# Patient Record
Sex: FEMALE | Race: BLACK OR AFRICAN AMERICAN | Employment: OTHER | URBAN - METROPOLITAN AREA
[De-identification: names, ages, dates, MRNs, and addresses within clinical notes are randomized per-mention and may not be internally consistent; named-entity substitution may affect disease eponyms.]

---

## 2021-07-21 ENCOUNTER — LAB ENCOUNTER (OUTPATIENT)
Dept: LAB | Facility: HOSPITAL | Age: 66
End: 2021-07-21
Attending: INTERNAL MEDICINE
Payer: COMMERCIAL

## 2021-07-21 ENCOUNTER — OFFICE VISIT (OUTPATIENT)
Dept: RHEUMATOLOGY | Facility: CLINIC | Age: 66
End: 2021-07-21
Payer: MEDICARE

## 2021-07-21 ENCOUNTER — HOSPITAL ENCOUNTER (OUTPATIENT)
Dept: GENERAL RADIOLOGY | Facility: HOSPITAL | Age: 66
Discharge: HOME | End: 2021-07-21
Attending: INTERNAL MEDICINE
Payer: COMMERCIAL

## 2021-07-21 VITALS
DIASTOLIC BLOOD PRESSURE: 74 MMHG | BODY MASS INDEX: 27.82 KG/M2 | HEIGHT: 65 IN | HEART RATE: 69 BPM | WEIGHT: 167 LBS | SYSTOLIC BLOOD PRESSURE: 108 MMHG

## 2021-07-21 DIAGNOSIS — M79.641 RIGHT HAND PAIN: ICD-10-CM

## 2021-07-21 DIAGNOSIS — M25.50 POLYARTHRALGIA: ICD-10-CM

## 2021-07-21 DIAGNOSIS — M79.641 RIGHT HAND PAIN: Primary | ICD-10-CM

## 2021-07-21 LAB
CRP SERPL-MCNC: 0.62 MG/DL (ref ?–0.3)
ERYTHROCYTE [SEDIMENTATION RATE] IN BLOOD: 34 MM/HR
RHEUMATOID FACT SERPL-ACNC: <10 IU/ML (ref ?–15)

## 2021-07-21 PROCEDURE — 73130 X-RAY EXAM OF HAND: CPT | Performed by: INTERNAL MEDICINE

## 2021-07-21 PROCEDURE — 86140 C-REACTIVE PROTEIN: CPT | Performed by: INTERNAL MEDICINE

## 2021-07-21 PROCEDURE — 86200 CCP ANTIBODY: CPT | Performed by: INTERNAL MEDICINE

## 2021-07-21 PROCEDURE — 36415 COLL VENOUS BLD VENIPUNCTURE: CPT | Performed by: INTERNAL MEDICINE

## 2021-07-21 PROCEDURE — 99204 OFFICE O/P NEW MOD 45 MIN: CPT | Performed by: INTERNAL MEDICINE

## 2021-07-21 PROCEDURE — 86431 RHEUMATOID FACTOR QUANT: CPT | Performed by: INTERNAL MEDICINE

## 2021-07-21 PROCEDURE — 85652 RBC SED RATE AUTOMATED: CPT | Performed by: INTERNAL MEDICINE

## 2021-07-21 RX ORDER — HYDROCHLOROTHIAZIDE 12.5 MG/1
12.5 CAPSULE, GELATIN COATED ORAL DAILY
COMMUNITY

## 2021-07-21 NOTE — PROGRESS NOTES
Ranjith Agosto is a 73year old female who presents for Patient presents with:  Consult  . HPI:   CC: joint pain  Consult: referred by PCP Dr. Abiodun Espinoza    This is a 74 yo F with hx of HTN, HLD, DM-2 days with joint pain.   She reports joint pain for many ye photosensitivity, no psoriasis  HEENT: No dry eyes, no dry mouth, no Raynaud's, no nasal ulcers, no parotid swelling, no neck pain, no jaw pain, no temple pain  Eyes: No visual changes,   CVS: No chest pain, no heart disease  RS: No SOB, no Cough, No Pleur worsened in March where she was not able to make a fist with her right hand. She also has limited abduction and flexion in both shoulders.   She has some swelling her MCPs, will work-up for inflammatory arthritis  - Plan to order ESR, CRP, RF and CCP  - Pl

## 2021-07-21 NOTE — PATIENT INSTRUCTIONS
You were seen today for joint pain, mostly in your hands and your shoulders  Plan to evaluate for autoimmune disease such as rheumatoid arthritis  Blood work and x-rays today  Follow-up in 2 weeks

## 2021-07-23 LAB — CCP IGG SERPL-ACNC: 0.7 U/ML (ref 0–6.9)

## 2021-07-29 ENCOUNTER — HOSPITAL ENCOUNTER (OUTPATIENT)
Dept: ULTRASOUND IMAGING | Facility: HOSPITAL | Age: 66
Discharge: HOME | End: 2021-07-29
Attending: INTERNAL MEDICINE
Payer: COMMERCIAL

## 2021-07-29 DIAGNOSIS — M79.641 RIGHT HAND PAIN: ICD-10-CM

## 2021-07-29 PROCEDURE — 76881 US COMPL JOINT R-T W/IMG: CPT | Performed by: INTERNAL MEDICINE

## 2021-08-04 ENCOUNTER — LAB ENCOUNTER (OUTPATIENT)
Dept: LAB | Facility: HOSPITAL | Age: 66
End: 2021-08-04
Attending: INTERNAL MEDICINE
Payer: COMMERCIAL

## 2021-08-04 ENCOUNTER — OFFICE VISIT (OUTPATIENT)
Dept: RHEUMATOLOGY | Facility: CLINIC | Age: 66
End: 2021-08-04
Payer: MEDICARE

## 2021-08-04 VITALS
HEART RATE: 76 BPM | SYSTOLIC BLOOD PRESSURE: 101 MMHG | DIASTOLIC BLOOD PRESSURE: 67 MMHG | WEIGHT: 166 LBS | HEIGHT: 65 IN | BODY MASS INDEX: 27.66 KG/M2

## 2021-08-04 DIAGNOSIS — R76.8 HEPATITIS B CORE ANTIBODY POSITIVE: ICD-10-CM

## 2021-08-04 DIAGNOSIS — M06.00 SERONEGATIVE RHEUMATOID ARTHRITIS (HCC): ICD-10-CM

## 2021-08-04 DIAGNOSIS — M06.00 SERONEGATIVE RHEUMATOID ARTHRITIS (HCC): Primary | ICD-10-CM

## 2021-08-04 LAB
ALBUMIN SERPL-MCNC: 3.5 G/DL (ref 3.4–5)
ALBUMIN/GLOB SERPL: 0.9 {RATIO} (ref 1–2)
ALP LIVER SERPL-CCNC: 57 U/L
ALT SERPL-CCNC: 14 U/L
ANION GAP SERPL CALC-SCNC: 7 MMOL/L (ref 0–18)
AST SERPL-CCNC: 8 U/L (ref 15–37)
BASOPHILS # BLD AUTO: 0.03 X10(3) UL (ref 0–0.2)
BASOPHILS NFR BLD AUTO: 0.5 %
BILIRUB SERPL-MCNC: 0.4 MG/DL (ref 0.1–2)
BUN BLD-MCNC: 10 MG/DL (ref 7–18)
BUN/CREAT SERPL: 18.2 (ref 10–20)
CALCIUM BLD-MCNC: 9.4 MG/DL (ref 8.5–10.1)
CHLORIDE SERPL-SCNC: 103 MMOL/L (ref 98–112)
CO2 SERPL-SCNC: 27 MMOL/L (ref 21–32)
CREAT BLD-MCNC: 0.55 MG/DL
DEPRECATED RDW RBC AUTO: 41.7 FL (ref 35.1–46.3)
EOSINOPHIL # BLD AUTO: 0.01 X10(3) UL (ref 0–0.7)
EOSINOPHIL NFR BLD AUTO: 0.2 %
ERYTHROCYTE [DISTWIDTH] IN BLOOD BY AUTOMATED COUNT: 12.9 % (ref 11–15)
GLOBULIN PLAS-MCNC: 4.1 G/DL (ref 2.8–4.4)
GLUCOSE BLD-MCNC: 78 MG/DL (ref 70–99)
HBV CORE AB SERPL QL IA: REACTIVE
HBV SURFACE AB SER QL: REACTIVE
HBV SURFACE AB SERPL IA-ACNC: 411.61 MIU/ML
HBV SURFACE AG SER-ACNC: <0.1 [IU]/L
HBV SURFACE AG SERPL QL IA: NONREACTIVE
HCT VFR BLD AUTO: 37.9 %
HCV AB SERPL QL IA: NONREACTIVE
HGB BLD-MCNC: 12.3 G/DL
IMM GRANULOCYTES # BLD AUTO: 0.05 X10(3) UL (ref 0–1)
IMM GRANULOCYTES NFR BLD: 0.8 %
LYMPHOCYTES # BLD AUTO: 1.25 X10(3) UL (ref 1–4)
LYMPHOCYTES NFR BLD AUTO: 21 %
M PROTEIN MFR SERPL ELPH: 7.6 G/DL (ref 6.4–8.2)
MCH RBC QN AUTO: 28.7 PG (ref 26–34)
MCHC RBC AUTO-ENTMCNC: 32.5 G/DL (ref 31–37)
MCV RBC AUTO: 88.6 FL
MONOCYTES # BLD AUTO: 0.47 X10(3) UL (ref 0.1–1)
MONOCYTES NFR BLD AUTO: 7.9 %
NEUTROPHILS # BLD AUTO: 4.13 X10 (3) UL (ref 1.5–7.7)
NEUTROPHILS # BLD AUTO: 4.13 X10(3) UL (ref 1.5–7.7)
NEUTROPHILS NFR BLD AUTO: 69.6 %
OSMOLALITY SERPL CALC.SUM OF ELEC: 282 MOSM/KG (ref 275–295)
PATIENT FASTING Y/N/NP: NO
PLATELET # BLD AUTO: 219 10(3)UL (ref 150–450)
POTASSIUM SERPL-SCNC: 3.3 MMOL/L (ref 3.5–5.1)
RBC # BLD AUTO: 4.28 X10(6)UL
SODIUM SERPL-SCNC: 137 MMOL/L (ref 136–145)
WBC # BLD AUTO: 5.9 X10(3) UL (ref 4–11)

## 2021-08-04 PROCEDURE — 82955 ASSAY OF G6PD ENZYME: CPT | Performed by: INTERNAL MEDICINE

## 2021-08-04 PROCEDURE — 86480 TB TEST CELL IMMUN MEASURE: CPT

## 2021-08-04 PROCEDURE — 87340 HEPATITIS B SURFACE AG IA: CPT | Performed by: INTERNAL MEDICINE

## 2021-08-04 PROCEDURE — 99214 OFFICE O/P EST MOD 30 MIN: CPT | Performed by: INTERNAL MEDICINE

## 2021-08-04 PROCEDURE — 36415 COLL VENOUS BLD VENIPUNCTURE: CPT | Performed by: INTERNAL MEDICINE

## 2021-08-04 PROCEDURE — 85025 COMPLETE CBC W/AUTO DIFF WBC: CPT | Performed by: INTERNAL MEDICINE

## 2021-08-04 PROCEDURE — 86803 HEPATITIS C AB TEST: CPT | Performed by: INTERNAL MEDICINE

## 2021-08-04 PROCEDURE — 80053 COMPREHEN METABOLIC PANEL: CPT | Performed by: INTERNAL MEDICINE

## 2021-08-04 PROCEDURE — 86704 HEP B CORE ANTIBODY TOTAL: CPT | Performed by: INTERNAL MEDICINE

## 2021-08-04 PROCEDURE — 86706 HEP B SURFACE ANTIBODY: CPT | Performed by: INTERNAL MEDICINE

## 2021-08-04 RX ORDER — PREDNISONE 10 MG/1
TABLET ORAL
Qty: 90 TABLET | Refills: 0 | Status: SHIPPED | OUTPATIENT
Start: 2021-08-04

## 2021-08-04 NOTE — PROGRESS NOTES
Darwin Huggins is a 73year old female. HPI:   Patient presents with: Follow - Up  Test Results      I had the pleasure of seeing Darwin Huggins on 8/4/2021 for follow up of newly dx PMR vs Seronegative RA.      Current Medications:  Ibuprofen 600 mg mayb prior to today's visit):  Current Outpatient Medications   Medication Sig Dispense Refill   • Coenzyme Q10 (COQ-10 OR) Take by mouth. • hydrochlorothiazide 12.5 MG Oral Cap Take 12.5 mg by mouth daily.      • Atorvastatin Calcium 20 MG Oral Tab TK 1 T P      ASSESSMENT/PLAN:     Seronegative RA (bilateral shoulder, hip stiffness and R hand swelling, ultrasound + for inflammatory arthritis)  - Plan to start prednisone 40 mg daily x4 days then 30 mg daily for x4 days then 20 mg daily x4 days then 10 mg sis

## 2021-08-04 NOTE — PATIENT INSTRUCTIONS
You were seen today for rheumatoid arthritis  Plan on getting some more blood work  We will start her on prednisone for now, Start 40 mg daily for 4 days then 30 mg daily for 4 days then 20 mg daily for 4 days then 10 mg daily for 4 days then stay on 5 mg

## 2021-08-06 LAB
M TB IFN-G CD4+ T-CELLS BLD-ACNC: 0.05 IU/ML
M TB TUBERC IFN-G BLD QL: NEGATIVE
M TB TUBERC IGNF/MITOGEN IGNF CONTROL: >10 IU/ML
QFT TB1 AG MINUS NIL: 0.02 IU/ML
QFT TB2 AG MINUS NIL: 0.02 IU/ML

## 2021-08-08 LAB — GLUCOSE-6-PHOSPHATE DEHYDROGENASE: 11.1 U/G HB

## 2021-08-10 ENCOUNTER — OFFICE VISIT (OUTPATIENT)
Dept: OTOLARYNGOLOGY | Facility: CLINIC | Age: 66
End: 2021-08-10
Payer: COMMERCIAL

## 2021-08-10 ENCOUNTER — LAB ENCOUNTER (OUTPATIENT)
Dept: LAB | Facility: HOSPITAL | Age: 66
End: 2021-08-10
Attending: INTERNAL MEDICINE
Payer: COMMERCIAL

## 2021-08-10 VITALS — HEIGHT: 65 IN | WEIGHT: 168 LBS | BODY MASS INDEX: 27.99 KG/M2

## 2021-08-10 DIAGNOSIS — M06.00 SERONEGATIVE RHEUMATOID ARTHRITIS (HCC): ICD-10-CM

## 2021-08-10 DIAGNOSIS — R76.8 HEPATITIS B CORE ANTIBODY POSITIVE: ICD-10-CM

## 2021-08-10 DIAGNOSIS — H61.23 BILATERAL IMPACTED CERUMEN: Primary | ICD-10-CM

## 2021-08-10 DIAGNOSIS — R42 DIZZINESS: ICD-10-CM

## 2021-08-10 PROCEDURE — 99203 OFFICE O/P NEW LOW 30 MIN: CPT | Performed by: OTOLARYNGOLOGY

## 2021-08-10 PROCEDURE — 36415 COLL VENOUS BLD VENIPUNCTURE: CPT

## 2021-08-10 PROCEDURE — 69210 REMOVE IMPACTED EAR WAX UNI: CPT | Performed by: OTOLARYNGOLOGY

## 2021-08-10 PROCEDURE — 3008F BODY MASS INDEX DOCD: CPT | Performed by: OTOLARYNGOLOGY

## 2021-08-10 PROCEDURE — 87517 HEPATITIS B DNA QUANT: CPT

## 2021-08-10 RX ORDER — MELATONIN
1000 DAILY
COMMUNITY
Start: 2021-03-15

## 2021-08-10 NOTE — PROGRESS NOTES
Rowdy Jay is a 73year old female. Patient presents with:  Dizziness: pt c/o dizziness, it has been going on for a few months now. She does not take any medication for this right now. She states it is a constant dizziness.   Ear Problem: pt states her wheezing. Cardio Negative Chest pain, irregular heartbeat/palpitations and syncope. GI Negative Abdominal pain and diarrhea. Endocrine Negative Cold intolerance and heat intolerance. Neuro Negative Tremors. Psych Negative Anxiety and depression. MCG Oral Tab, Take 1,000 mcg by mouth daily. , Disp: , Rfl:   •  predniSONE 10 MG Oral Tab, Start 40 mg daily for 4 days then 30 mg daily for 4 days then 20 mg daily for 4 days then 10 mg daily for 4 days then stay on 5 mg daily until you see me, Disp: 90 t

## 2021-08-13 LAB
HBV QNT BY NAAT (IU/ML): NOT DETECTED IU/ML
HBV QNT BY NAAT (LOG IU/ML): NOT DETECTED LOG IU/ML
HBV QNT BY NAAT INTERP: NOT DETECTED

## 2021-08-16 ENCOUNTER — TELEPHONE (OUTPATIENT)
Dept: OTOLARYNGOLOGY | Facility: CLINIC | Age: 66
End: 2021-08-16

## 2021-08-16 ENCOUNTER — TELEPHONE (OUTPATIENT)
Dept: RHEUMATOLOGY | Facility: CLINIC | Age: 66
End: 2021-08-16

## 2021-08-16 RX ORDER — METHOTREXATE 2.5 MG/1
TABLET ORAL
Qty: 72 TABLET | Refills: 0 | Status: SHIPPED | OUTPATIENT
Start: 2021-08-16 | End: 2021-10-20

## 2021-08-16 RX ORDER — FOLIC ACID 1 MG/1
1 TABLET ORAL DAILY
Qty: 90 TABLET | Refills: 0 | Status: SHIPPED | OUTPATIENT
Start: 2021-08-16 | End: 2021-10-20

## 2021-08-16 NOTE — TELEPHONE ENCOUNTER
Per pt has questions about the physical therapy. Per pt does not know what the physical therapy is for and if it is should be done prior to her vng appt.   Please advise

## 2021-08-18 ENCOUNTER — TELEPHONE (OUTPATIENT)
Dept: RHEUMATOLOGY | Facility: CLINIC | Age: 66
End: 2021-08-18

## 2021-08-18 NOTE — TELEPHONE ENCOUNTER
Spoke to patient and confined schedule and dosage listed below. She verbalized understanding.      08/16/ Lab not  \"Discussed results with patient. Elsa Scott is able to start methotrexate day. Yared Christensen start 4 pills weekly for 2 weeks and then increase to 6 pills

## 2021-08-18 NOTE — TELEPHONE ENCOUNTER
Rn advised pt the therapy is for patient dizziness,and Can schedule it before the VNG,pt verbalized understanding stated already made appt for physical therapy and VNG.

## 2021-08-18 NOTE — TELEPHONE ENCOUNTER
Patient was prescribed methotrexate and folic acid. Patient would like to know if she should start both medications at the same time. Please advise.

## 2021-09-01 ENCOUNTER — OFFICE VISIT (OUTPATIENT)
Dept: AUDIOLOGY | Facility: CLINIC | Age: 66
End: 2021-09-01
Payer: COMMERCIAL

## 2021-09-01 DIAGNOSIS — R42 DIZZINESS: Primary | ICD-10-CM

## 2021-09-01 PROCEDURE — 92537 CALORIC VSTBLR TEST W/REC: CPT | Performed by: AUDIOLOGIST

## 2021-09-01 PROCEDURE — 92567 TYMPANOMETRY: CPT | Performed by: AUDIOLOGIST

## 2021-09-01 PROCEDURE — 92517 VEMP TEST I&R CERVICAL: CPT | Performed by: AUDIOLOGIST

## 2021-09-01 PROCEDURE — 92557 COMPREHENSIVE HEARING TEST: CPT | Performed by: AUDIOLOGIST

## 2021-09-01 PROCEDURE — 92540 BASIC VESTIBULAR EVALUATION: CPT | Performed by: AUDIOLOGIST

## 2021-09-02 ENCOUNTER — TELEPHONE (OUTPATIENT)
Dept: PHYSICAL THERAPY | Facility: HOSPITAL | Age: 66
End: 2021-09-02

## 2021-09-04 NOTE — PROGRESS NOTES
AUDIOLOGY REPORT      Eloisa Fraser is a 73year old female     Referring Provider: Tanika Wills   YOB: 1955  Medical Record: [id number]      Patient Hearing History:  Patient reported a history of intermittent imbalance that first began abo Testing:  Sitting:  No horizontal nystagmus  Supine:  No horizontal nystagmus  Head Right: No horizontal nystagmus  Head Left: Mild left-beating horizontal nystagmus of 4-5 degrees per second. Right Lateral: No horizontal nystagmus.    Left Lateral: Mild

## 2021-09-07 ENCOUNTER — OFFICE VISIT (OUTPATIENT)
Dept: PHYSICAL THERAPY | Facility: HOSPITAL | Age: 66
End: 2021-09-07
Attending: OTOLARYNGOLOGY
Payer: COMMERCIAL

## 2021-09-07 DIAGNOSIS — R42 DIZZINESS: ICD-10-CM

## 2021-09-07 PROCEDURE — 97162 PT EVAL MOD COMPLEX 30 MIN: CPT

## 2021-09-08 ENCOUNTER — TELEPHONE (OUTPATIENT)
Dept: OTOLARYNGOLOGY | Facility: CLINIC | Age: 66
End: 2021-09-08

## 2021-09-08 ENCOUNTER — OFFICE VISIT (OUTPATIENT)
Dept: RHEUMATOLOGY | Facility: CLINIC | Age: 66
End: 2021-09-08
Payer: COMMERCIAL

## 2021-09-08 VITALS
SYSTOLIC BLOOD PRESSURE: 111 MMHG | HEIGHT: 65 IN | WEIGHT: 168 LBS | DIASTOLIC BLOOD PRESSURE: 74 MMHG | HEART RATE: 65 BPM | BODY MASS INDEX: 27.99 KG/M2

## 2021-09-08 DIAGNOSIS — M06.00 SERONEGATIVE RHEUMATOID ARTHRITIS (HCC): Primary | ICD-10-CM

## 2021-09-08 DIAGNOSIS — Z51.81 MEDICATION MONITORING ENCOUNTER: ICD-10-CM

## 2021-09-08 PROCEDURE — 99214 OFFICE O/P EST MOD 30 MIN: CPT | Performed by: INTERNAL MEDICINE

## 2021-09-08 RX ORDER — PREDNISONE 2.5 MG
2.5 TABLET ORAL DAILY
Qty: 30 TABLET | Refills: 0 | Status: SHIPPED | OUTPATIENT
Start: 2021-09-08

## 2021-09-08 NOTE — TELEPHONE ENCOUNTER
Sterling Valerio MD  P  Ent Clinical Staff  Inform pt that the vng test  was normal. This  indcates that the  dizziness is not coming from the  inner ears.  If the symptoms are not improving I  recommend she consider seeing neurology.

## 2021-09-08 NOTE — PROGRESS NOTES
Emelia Valencia is a 73year old female. HPI:   No chief complaint on file. I had the pleasure of seeing Emelia Valencia on 9/8/2021 for follow up of newly dx Seronegative RA.      Current Medications:  Methotrexate 6 pills weekly and FA daily- started Diagnosis Date   • Diabetes [facility])    • Essential hypertension    • Hyperlipidemia       Social Hx Reviewed   Family Hx Reviewed     Medications (Active prior to today's visit):  Current Outpatient Medications   Medication Sig Dispense Refill   • Methotre pain or warmth on palpation with FROM  Spine: no lumbar or sacral pain on palpation. NEURO: Cranial nerves II-XII intact grossly. 5/5 strength throughout in both upper and lower extremities, sensation intact.   PSYCH: normal mood       LABS:     Component

## 2021-09-08 NOTE — PATIENT INSTRUCTIONS
You were seen today for RA  Cont methotrexate 6 pills weekly  Folic acid daily  Go down to prednisone 2.5 mg daily for 2 weeks then 2.5 mg every other day for 2 weeks then stop  Blood work in 6 weeks  Follow up in 6 weeks

## 2021-09-17 ENCOUNTER — APPOINTMENT (OUTPATIENT)
Dept: PHYSICAL THERAPY | Facility: HOSPITAL | Age: 66
End: 2021-09-17
Attending: OTOLARYNGOLOGY
Payer: COMMERCIAL

## 2021-09-21 ENCOUNTER — TELEPHONE (OUTPATIENT)
Dept: PHYSICAL THERAPY | Facility: HOSPITAL | Age: 66
End: 2021-09-21

## 2021-09-23 ENCOUNTER — APPOINTMENT (OUTPATIENT)
Dept: PHYSICAL THERAPY | Facility: HOSPITAL | Age: 66
End: 2021-09-23
Attending: OTOLARYNGOLOGY
Payer: COMMERCIAL

## 2021-09-29 ENCOUNTER — APPOINTMENT (OUTPATIENT)
Dept: PHYSICAL THERAPY | Facility: HOSPITAL | Age: 66
End: 2021-09-29
Attending: OTOLARYNGOLOGY
Payer: COMMERCIAL

## 2021-10-07 ENCOUNTER — APPOINTMENT (OUTPATIENT)
Dept: PHYSICAL THERAPY | Facility: HOSPITAL | Age: 66
End: 2021-10-07
Attending: OTOLARYNGOLOGY
Payer: COMMERCIAL

## 2021-10-15 ENCOUNTER — APPOINTMENT (OUTPATIENT)
Dept: PHYSICAL THERAPY | Facility: HOSPITAL | Age: 66
End: 2021-10-15
Attending: OTOLARYNGOLOGY
Payer: COMMERCIAL

## 2021-10-17 ENCOUNTER — LAB ENCOUNTER (OUTPATIENT)
Dept: LAB | Facility: HOSPITAL | Age: 66
End: 2021-10-17
Attending: INTERNAL MEDICINE
Payer: MEDICARE

## 2021-10-17 DIAGNOSIS — M06.00 SERONEGATIVE RHEUMATOID ARTHRITIS (HCC): ICD-10-CM

## 2021-10-17 DIAGNOSIS — Z51.81 MEDICATION MONITORING ENCOUNTER: ICD-10-CM

## 2021-10-17 PROCEDURE — 85652 RBC SED RATE AUTOMATED: CPT

## 2021-10-17 PROCEDURE — 82040 ASSAY OF SERUM ALBUMIN: CPT

## 2021-10-17 PROCEDURE — 36415 COLL VENOUS BLD VENIPUNCTURE: CPT

## 2021-10-17 PROCEDURE — 82565 ASSAY OF CREATININE: CPT

## 2021-10-17 PROCEDURE — 84450 TRANSFERASE (AST) (SGOT): CPT

## 2021-10-17 PROCEDURE — 85025 COMPLETE CBC W/AUTO DIFF WBC: CPT

## 2021-10-17 PROCEDURE — 84460 ALANINE AMINO (ALT) (SGPT): CPT

## 2021-10-17 PROCEDURE — 86140 C-REACTIVE PROTEIN: CPT

## 2021-10-18 ENCOUNTER — TELEPHONE (OUTPATIENT)
Dept: RHEUMATOLOGY | Facility: CLINIC | Age: 66
End: 2021-10-18

## 2021-10-19 ENCOUNTER — TELEPHONE (OUTPATIENT)
Dept: PHYSICAL MEDICINE AND REHAB | Facility: CLINIC | Age: 66
End: 2021-10-19

## 2021-10-19 ENCOUNTER — LAB ENCOUNTER (OUTPATIENT)
Dept: LAB | Facility: HOSPITAL | Age: 66
End: 2021-10-19
Attending: Other
Payer: MEDICARE

## 2021-10-19 ENCOUNTER — OFFICE VISIT (OUTPATIENT)
Dept: NEUROLOGY | Facility: CLINIC | Age: 66
End: 2021-10-19
Payer: COMMERCIAL

## 2021-10-19 VITALS
SYSTOLIC BLOOD PRESSURE: 114 MMHG | BODY MASS INDEX: 29.02 KG/M2 | HEART RATE: 83 BPM | WEIGHT: 170 LBS | DIASTOLIC BLOOD PRESSURE: 66 MMHG | HEIGHT: 64 IN

## 2021-10-19 DIAGNOSIS — R42 DIZZY: Primary | ICD-10-CM

## 2021-10-19 DIAGNOSIS — R42 DIZZY: ICD-10-CM

## 2021-10-19 PROCEDURE — 86255 FLUORESCENT ANTIBODY SCREEN: CPT

## 2021-10-19 PROCEDURE — 3008F BODY MASS INDEX DOCD: CPT | Performed by: OTHER

## 2021-10-19 PROCEDURE — 82607 VITAMIN B-12: CPT

## 2021-10-19 PROCEDURE — 99204 OFFICE O/P NEW MOD 45 MIN: CPT | Performed by: OTHER

## 2021-10-19 PROCEDURE — 3078F DIAST BP <80 MM HG: CPT | Performed by: OTHER

## 2021-10-19 PROCEDURE — 84443 ASSAY THYROID STIM HORMONE: CPT

## 2021-10-19 PROCEDURE — 3074F SYST BP LT 130 MM HG: CPT | Performed by: OTHER

## 2021-10-19 PROCEDURE — 36415 COLL VENOUS BLD VENIPUNCTURE: CPT

## 2021-10-19 RX ORDER — IBUPROFEN 600 MG/1
600 TABLET ORAL AS DIRECTED
COMMUNITY

## 2021-10-19 NOTE — TELEPHONE ENCOUNTER
Initiated authorization for Brain MRI CPT 89366 to be done at [address] [place] [address] via [employer] online  Status: Approved with order #[id number] valid 10/19/21-12/17/21    LM informing patient of the above

## 2021-10-19 NOTE — TELEPHONE ENCOUNTER
Patient Para Filter reviewed her blood work. Her CBC, liver test and kidney function was normal.  ESR was slightly elevated at 31. She was doing well at her last visit.   She does not have my chart
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
## 2021-10-19 NOTE — TELEPHONE ENCOUNTER
Name and  verified, pt given results below per provider. No additional questions at this time. Pt has appointment with provider tomorrow.

## 2021-10-19 NOTE — PROGRESS NOTES
Subjective:   Patient ID: Noah Miller is a 73year old female. HPI    History/Other:   Review of Systems  Current Outpatient Medications   Medication Sig Dispense Refill   • predniSONE 2.5 MG Oral Tab Take 1 tablet (2.5 mg total) by mouth daily.  30 ta

## 2021-10-20 ENCOUNTER — OFFICE VISIT (OUTPATIENT)
Dept: RHEUMATOLOGY | Facility: CLINIC | Age: 66
End: 2021-10-20
Payer: MEDICARE

## 2021-10-20 VITALS
HEIGHT: 64 IN | WEIGHT: 175 LBS | HEART RATE: 64 BPM | DIASTOLIC BLOOD PRESSURE: 68 MMHG | SYSTOLIC BLOOD PRESSURE: 105 MMHG | BODY MASS INDEX: 29.88 KG/M2

## 2021-10-20 DIAGNOSIS — Z51.81 MEDICATION MONITORING ENCOUNTER: ICD-10-CM

## 2021-10-20 DIAGNOSIS — M06.00 SERONEGATIVE RHEUMATOID ARTHRITIS (HCC): Primary | ICD-10-CM

## 2021-10-20 DIAGNOSIS — R76.8 HEPATITIS B CORE ANTIBODY POSITIVE: ICD-10-CM

## 2021-10-20 PROCEDURE — 99214 OFFICE O/P EST MOD 30 MIN: CPT | Performed by: INTERNAL MEDICINE

## 2021-10-20 RX ORDER — FOLIC ACID 1 MG/1
1 TABLET ORAL DAILY
Qty: 90 TABLET | Refills: 0 | Status: SHIPPED | OUTPATIENT
Start: 2021-10-20 | End: 2021-12-01

## 2021-10-20 RX ORDER — METHOTREXATE 2.5 MG/1
TABLET ORAL
Qty: 72 TABLET | Refills: 0 | Status: SHIPPED | OUTPATIENT
Start: 2021-10-20 | End: 2021-12-01

## 2021-10-20 NOTE — PATIENT INSTRUCTIONS
You were seen today for rheumatoid arthritis, joints are better controlled  Continue methotrexate 6 pills weekly and folic acid every day  Finish off the prednisone  We will reevaluate in 6 weeks to see how you are doing

## 2021-10-20 NOTE — PROGRESS NOTES
Noah Miller is a 73year old female. HPI:   No chief complaint on file. I had the pleasure of seeing Noah Miller on 10/20/2021 for follow up of newly dx Seronegative RA.      Current Medications:  Methotrexate 6 pills weekly and FA daily- starte RA  Currently on methotrexate 6 pills weekly and folic acid daily  Also on prednisone 2.5 mg daily  Blood work reviewed, normal kidney and liver tests.   ESR remains elevated at 31  Feels some stiffness in the joints, hips, knees and ankles  Initially on hi rubs or gallops. Equal 2+ distal pulses. LUNGS: CTAB, no increased work of breathing  ABDOMEN:  soft NT/ND, +BS, no HSM  SKIN: No rashes or skin lesions.  No nail findings  MSK:  Cervical spine: FROM  Hands: MCP no swelling, able to close hand better  [facility]

## 2021-10-22 ENCOUNTER — APPOINTMENT (OUTPATIENT)
Dept: PHYSICAL THERAPY | Facility: HOSPITAL | Age: 66
End: 2021-10-22
Attending: OTOLARYNGOLOGY
Payer: COMMERCIAL

## 2021-11-02 ENCOUNTER — HOSPITAL ENCOUNTER (OUTPATIENT)
Dept: MRI IMAGING | Facility: HOSPITAL | Age: 66
Discharge: HOME | End: 2021-11-02
Attending: Other
Payer: MEDICARE

## 2021-11-02 DIAGNOSIS — R42 DIZZY: ICD-10-CM

## 2021-11-02 PROCEDURE — 70551 MRI BRAIN STEM W/O DYE: CPT | Performed by: OTHER

## 2021-11-08 ENCOUNTER — TELEPHONE (OUTPATIENT)
Dept: PHYSICAL MEDICINE AND REHAB | Facility: CLINIC | Age: 66
End: 2021-11-08

## 2021-12-01 ENCOUNTER — OFFICE VISIT (OUTPATIENT)
Dept: RHEUMATOLOGY | Facility: CLINIC | Age: 66
End: 2021-12-01
Payer: MEDICARE

## 2021-12-01 VITALS
DIASTOLIC BLOOD PRESSURE: 74 MMHG | BODY MASS INDEX: 29.88 KG/M2 | SYSTOLIC BLOOD PRESSURE: 110 MMHG | HEIGHT: 64 IN | WEIGHT: 175 LBS | HEART RATE: 80 BPM

## 2021-12-01 DIAGNOSIS — M06.00 SERONEGATIVE RHEUMATOID ARTHRITIS (HCC): Primary | ICD-10-CM

## 2021-12-01 DIAGNOSIS — Z51.81 MEDICATION MONITORING ENCOUNTER: ICD-10-CM

## 2021-12-01 PROCEDURE — 99214 OFFICE O/P EST MOD 30 MIN: CPT | Performed by: INTERNAL MEDICINE

## 2021-12-01 RX ORDER — FOLIC ACID 1 MG/1
1 TABLET ORAL DAILY
Qty: 90 TABLET | Refills: 2 | Status: SHIPPED | OUTPATIENT
Start: 2021-12-01

## 2021-12-01 RX ORDER — PREDNISONE 1 MG/1
5 TABLET ORAL DAILY
Qty: 60 TABLET | Refills: 0 | Status: SHIPPED | OUTPATIENT
Start: 2021-12-01

## 2021-12-01 RX ORDER — METHOTREXATE 2.5 MG/1
TABLET ORAL
Qty: 120 TABLET | Refills: 0 | Status: SHIPPED | OUTPATIENT
Start: 2021-12-01 | End: 2022-02-02

## 2021-12-01 NOTE — PROGRESS NOTES
Emelia Valencia is a 73year old female. HPI:   No chief complaint on file. I had the pleasure of seeing Emelia Valencia on 12/1/2021 for follow up of newly dx Seronegative RA.      Current Medications:  Methotrexate 6 pills weekly and FA daily- started methotrexate 6 pills weekly and folic acid daily  Also on prednisone 2.5 mg daily  Blood work reviewed, normal kidney and liver tests.   ESR remains elevated at 31  Feels some stiffness in the joints, hips, knees and ankles  Initially on higher dose of pred daily.     • Atorvastatin Calcium 20 MG Oral Tab TK 1 T PO  D  0   • MetFORMIN HCl  MG Oral Tablet 24 Hr TK 1 T PO  QD  0   • BYSTOLIC 5 MG Oral Tab TK 1 T PO  QD  0     .cmed  Allergies:    Sulfur-Salicylic Ac*        Comment:hives      ROS:   All o will take 8 pills weekly for 2 weeks and then go up to 10 pills weekly  - Plan to restart prednisone, 5 mg daily for 3 weeks then 2.5 mg daily for 2 weeks then stop  - Blood work in 2 mos     +Hep B core Ab   - HBV PCR negative  - We will need to monitor h

## 2021-12-01 NOTE — PATIENT INSTRUCTIONS
You were seen today for rheumatoid arthritis  Since being off of the prednisone your joint pain has come back  Plan to increase her methotrexate  Take 8 pills every 7 days for 2 weeks and then go up to 10 pills every 7 days  You also restart the prednisone

## 2022-01-05 ENCOUNTER — TELEPHONE (OUTPATIENT)
Dept: NEUROLOGY | Facility: CLINIC | Age: 67
End: 2022-01-05

## 2022-01-05 NOTE — TELEPHONE ENCOUNTER
Returned call to pt. Pt states she completed MRI & labs ordered by Dr Golden Almodovar. Pt asking why she needs to follow-up if MRI negative. Pt reports she still feels same as initial visit with Dr Golden Almodovar. Pt reports since 2017 she had vertigo that would come & go.

## 2022-01-06 ENCOUNTER — OFFICE VISIT (OUTPATIENT)
Dept: NEUROLOGY | Facility: CLINIC | Age: 67
End: 2022-01-06
Payer: COMMERCIAL

## 2022-01-06 VITALS
DIASTOLIC BLOOD PRESSURE: 86 MMHG | WEIGHT: 177 LBS | SYSTOLIC BLOOD PRESSURE: 132 MMHG | HEIGHT: 64 IN | OXYGEN SATURATION: 98 % | BODY MASS INDEX: 30.22 KG/M2 | HEART RATE: 88 BPM

## 2022-01-06 DIAGNOSIS — R42 DIZZY: Primary | ICD-10-CM

## 2022-01-06 PROCEDURE — 3079F DIAST BP 80-89 MM HG: CPT | Performed by: OTHER

## 2022-01-06 PROCEDURE — 3075F SYST BP GE 130 - 139MM HG: CPT | Performed by: OTHER

## 2022-01-06 PROCEDURE — 99213 OFFICE O/P EST LOW 20 MIN: CPT | Performed by: OTHER

## 2022-01-06 PROCEDURE — 3008F BODY MASS INDEX DOCD: CPT | Performed by: OTHER

## 2022-01-06 NOTE — PROGRESS NOTES
Jorge Payne, relates that she is still dizzy. She denies vertigo. No falls. She states she feels off balance. No headache. No visual symptoms. No cranial nerve symptoms. No cervical, lumbar spine pain. No upper or lower extremity symptoms.   After the roseanne

## 2022-01-11 ENCOUNTER — TELEPHONE (OUTPATIENT)
Dept: PHYSICAL THERAPY | Facility: HOSPITAL | Age: 67
End: 2022-01-11

## 2022-01-13 ENCOUNTER — OFFICE VISIT (OUTPATIENT)
Dept: PHYSICAL THERAPY | Facility: HOSPITAL | Age: 67
End: 2022-01-13
Attending: Other
Payer: COMMERCIAL

## 2022-01-13 DIAGNOSIS — R42 DIZZY: ICD-10-CM

## 2022-01-13 PROCEDURE — 97162 PT EVAL MOD COMPLEX 30 MIN: CPT

## 2022-01-13 NOTE — PROGRESS NOTES
VESTIBULAR EVALUATION:   Referring Physician: Dr. Zhu  Diagnosis: Dizziness     Date of Service: 1/13/22   Insurance: Paty Harp is a 73year old female who presents to therapy today with reports of Dizziness.   Hist community without worry re: dizziness. Past medical history was reviewed with Regan Smith. Significant findings include  has a past medical history of Diabetes ([address] Essential hypertension, and Hyperlipidemia.      ASSESSMENT  Regan Smith presents to physical therapy appropriate  SLS:  TBA as appropriate    Functional Mobility:   Gait: pt ambulates on level ground with path deviation with visual scanning.    Functional Gait Assessment (FGA): TBA   Timed Up and Go: 12.7 sec   Five Times Sit to Stand Test: 21 sec = fall r and for this course of care. Thank you for your referral. Please co-sign or sign and return this letter via fax as soon as possible to [phone]. If you have any questions, please contact me at [phone].     Sincerely,  Electronically signed by

## 2022-01-18 ENCOUNTER — APPOINTMENT (OUTPATIENT)
Dept: PHYSICAL THERAPY | Facility: HOSPITAL | Age: 67
End: 2022-01-18
Attending: Other
Payer: COMMERCIAL

## 2022-01-20 ENCOUNTER — OFFICE VISIT (OUTPATIENT)
Dept: PHYSICAL THERAPY | Facility: HOSPITAL | Age: 67
End: 2022-01-20
Attending: Other
Payer: COMMERCIAL

## 2022-01-20 PROCEDURE — 97116 GAIT TRAINING THERAPY: CPT

## 2022-01-20 PROCEDURE — 97112 NEUROMUSCULAR REEDUCATION: CPT

## 2022-01-20 NOTE — PROGRESS NOTES
Date  1/20/22           Visit Number 2/10           [facility] x           Ther EX            Ther Act            Gait Training            CRM             Manual              Dx: Dizziness, gait Instability         Insurance:  Medicare    Visit # authorized:  53

## 2022-01-21 ENCOUNTER — APPOINTMENT (OUTPATIENT)
Dept: PHYSICAL THERAPY | Facility: HOSPITAL | Age: 67
End: 2022-01-21
Attending: Other
Payer: COMMERCIAL

## 2022-01-25 ENCOUNTER — OFFICE VISIT (OUTPATIENT)
Dept: PHYSICAL THERAPY | Facility: HOSPITAL | Age: 67
End: 2022-01-25
Attending: Other
Payer: COMMERCIAL

## 2022-01-25 PROCEDURE — 97112 NEUROMUSCULAR REEDUCATION: CPT

## 2022-01-25 NOTE — PROGRESS NOTES
Date  1/20/22 1/25/22          Visit Number 2/10 3/10          NMR x x          Ther EX            Ther Act            Gait Training            CRM             Manual              Dx: Dizziness, gait Instability         Insurance:  Medicare    Visit # Janice Castrejon

## 2022-01-27 ENCOUNTER — OFFICE VISIT (OUTPATIENT)
Dept: PHYSICAL THERAPY | Facility: HOSPITAL | Age: 67
End: 2022-01-27
Attending: Other
Payer: COMMERCIAL

## 2022-01-27 PROCEDURE — 97112 NEUROMUSCULAR REEDUCATION: CPT

## 2022-01-27 NOTE — PROGRESS NOTES
Date  1/20/22 1/25/22 1/27/22         Visit Number 2/10 3/10 4/10         NMR x x x         Ther EX            Ther Act            Gait Training            CRM             Manual              Dx: Dizziness, gait Instability         Insurance:  Medicare

## 2022-01-28 ENCOUNTER — APPOINTMENT (OUTPATIENT)
Dept: PHYSICAL THERAPY | Facility: HOSPITAL | Age: 67
End: 2022-01-28
Attending: Other
Payer: COMMERCIAL

## 2022-01-31 ENCOUNTER — OFFICE VISIT (OUTPATIENT)
Dept: PHYSICAL THERAPY | Facility: HOSPITAL | Age: 67
End: 2022-01-31
Attending: Other
Payer: COMMERCIAL

## 2022-01-31 PROCEDURE — 97112 NEUROMUSCULAR REEDUCATION: CPT

## 2022-01-31 NOTE — PROGRESS NOTES
Date  1/20/22 1/25/22 1/27/22 1/31/22        Visit Number 2/10 3/10 4/10 5/10        NMR x x x x        Ther EX            Ther Act            Gait Training            CRM             Manual              Dx: Dizziness, gait Instability         Insurance:

## 2022-02-01 ENCOUNTER — APPOINTMENT (OUTPATIENT)
Dept: PHYSICAL THERAPY | Facility: HOSPITAL | Age: 67
End: 2022-02-01
Attending: Other
Payer: COMMERCIAL

## 2022-02-02 ENCOUNTER — LAB ENCOUNTER (OUTPATIENT)
Dept: LAB | Facility: HOSPITAL | Age: 67
End: 2022-02-02
Attending: INTERNAL MEDICINE
Payer: MEDICARE

## 2022-02-02 ENCOUNTER — OFFICE VISIT (OUTPATIENT)
Dept: RHEUMATOLOGY | Facility: CLINIC | Age: 67
End: 2022-02-02
Payer: MEDICARE

## 2022-02-02 VITALS
BODY MASS INDEX: 29.71 KG/M2 | SYSTOLIC BLOOD PRESSURE: 103 MMHG | WEIGHT: 174 LBS | HEART RATE: 81 BPM | DIASTOLIC BLOOD PRESSURE: 72 MMHG | HEIGHT: 64 IN

## 2022-02-02 DIAGNOSIS — Z51.81 MEDICATION MONITORING ENCOUNTER: ICD-10-CM

## 2022-02-02 DIAGNOSIS — M06.00 SERONEGATIVE RHEUMATOID ARTHRITIS (HCC): ICD-10-CM

## 2022-02-02 DIAGNOSIS — M06.00 SERONEGATIVE RHEUMATOID ARTHRITIS (HCC): Primary | ICD-10-CM

## 2022-02-02 LAB
ALBUMIN SERPL-MCNC: 3.5 G/DL (ref 3.4–5)
ALT SERPL-CCNC: 16 U/L
AST SERPL-CCNC: 8 U/L (ref 15–37)
BASOPHILS # BLD AUTO: 0.02 X10(3) UL (ref 0–0.2)
BASOPHILS NFR BLD AUTO: 0.2 %
CREAT BLD-MCNC: 0.6 MG/DL
CRP SERPL-MCNC: 0.87 MG/DL (ref ?–0.3)
DEPRECATED RDW RBC AUTO: 47 FL (ref 35.1–46.3)
EOSINOPHIL # BLD AUTO: 0.04 X10(3) UL (ref 0–0.7)
EOSINOPHIL NFR BLD AUTO: 0.4 %
ERYTHROCYTE [DISTWIDTH] IN BLOOD BY AUTOMATED COUNT: 14.2 % (ref 11–15)
ERYTHROCYTE [SEDIMENTATION RATE] IN BLOOD: 41 MM/HR
HCT VFR BLD AUTO: 38.2 %
HGB BLD-MCNC: 12.1 G/DL
IMM GRANULOCYTES # BLD AUTO: 0.14 X10(3) UL (ref 0–1)
IMM GRANULOCYTES NFR BLD: 1.5 %
LYMPHOCYTES # BLD AUTO: 1.24 X10(3) UL (ref 1–4)
LYMPHOCYTES NFR BLD AUTO: 13.4 %
MCH RBC QN AUTO: 29.7 PG (ref 26–34)
MCHC RBC AUTO-ENTMCNC: 31.7 G/DL (ref 31–37)
MCV RBC AUTO: 93.6 FL
MONOCYTES # BLD AUTO: 0.69 X10(3) UL (ref 0.1–1)
MONOCYTES NFR BLD AUTO: 7.5 %
NEUTROPHILS # BLD AUTO: 7.11 X10 (3) UL (ref 1.5–7.7)
NEUTROPHILS # BLD AUTO: 7.11 X10(3) UL (ref 1.5–7.7)
NEUTROPHILS NFR BLD AUTO: 77 %
PLATELET # BLD AUTO: 282 10(3)UL (ref 150–450)
RBC # BLD AUTO: 4.08 X10(6)UL
WBC # BLD AUTO: 9.2 X10(3) UL (ref 4–11)

## 2022-02-02 PROCEDURE — 85652 RBC SED RATE AUTOMATED: CPT

## 2022-02-02 PROCEDURE — 84450 TRANSFERASE (AST) (SGOT): CPT

## 2022-02-02 PROCEDURE — 99214 OFFICE O/P EST MOD 30 MIN: CPT | Performed by: INTERNAL MEDICINE

## 2022-02-02 PROCEDURE — 36415 COLL VENOUS BLD VENIPUNCTURE: CPT

## 2022-02-02 PROCEDURE — 84460 ALANINE AMINO (ALT) (SGPT): CPT

## 2022-02-02 PROCEDURE — 85025 COMPLETE CBC W/AUTO DIFF WBC: CPT

## 2022-02-02 PROCEDURE — 82040 ASSAY OF SERUM ALBUMIN: CPT

## 2022-02-02 PROCEDURE — 82565 ASSAY OF CREATININE: CPT

## 2022-02-02 PROCEDURE — 86140 C-REACTIVE PROTEIN: CPT

## 2022-02-02 RX ORDER — METHOTREXATE 2.5 MG/1
25 TABLET ORAL
Qty: 130 TABLET | Refills: 0 | Status: SHIPPED | OUTPATIENT
Start: 2022-02-02

## 2022-02-02 RX ORDER — PREDNISONE 2.5 MG
2.5 TABLET ORAL 2 TIMES DAILY
Qty: 30 TABLET | Refills: 0 | Status: SHIPPED | OUTPATIENT
Start: 2022-02-02

## 2022-02-02 RX ORDER — CARVEDILOL 3.12 MG/1
TABLET ORAL
COMMUNITY
Start: 2022-01-31 | End: 2022-02-18

## 2022-02-02 NOTE — PATIENT INSTRUCTIONS
You were seen today for RA  Continue methotorexate 10 pills weekly  Go down on predinsone 2.5 mg daily for 3 weeks then stop  Blood work today  Follow up in 6 weeks

## 2022-02-04 ENCOUNTER — APPOINTMENT (OUTPATIENT)
Dept: PHYSICAL THERAPY | Facility: HOSPITAL | Age: 67
End: 2022-02-04
Attending: Other
Payer: COMMERCIAL

## 2022-02-07 ENCOUNTER — TELEPHONE (OUTPATIENT)
Dept: RHEUMATOLOGY | Facility: CLINIC | Age: 67
End: 2022-02-07

## 2022-02-07 ENCOUNTER — OFFICE VISIT (OUTPATIENT)
Dept: PHYSICAL THERAPY | Facility: HOSPITAL | Age: 67
End: 2022-02-07
Attending: Other
Payer: MEDICARE

## 2022-02-07 ENCOUNTER — LAB ENCOUNTER (OUTPATIENT)
Dept: LAB | Facility: HOSPITAL | Age: 67
End: 2022-02-07
Attending: INTERNAL MEDICINE
Payer: MEDICARE

## 2022-02-07 DIAGNOSIS — E55.9 VITAMIN D DEFICIENCY: ICD-10-CM

## 2022-02-07 DIAGNOSIS — D51.0 PERNICIOUS ANEMIA: ICD-10-CM

## 2022-02-07 DIAGNOSIS — E78.6 FAMILIAL LIPOPROTEIN DEFICIENCY: Primary | ICD-10-CM

## 2022-02-07 DIAGNOSIS — E11.9 DIABETES MELLITUS (HCC): ICD-10-CM

## 2022-02-07 LAB
ALBUMIN SERPL-MCNC: 3.4 G/DL (ref 3.4–5)
ALBUMIN/GLOB SERPL: 1 {RATIO} (ref 1–2)
ALP LIVER SERPL-CCNC: 63 U/L
ALT SERPL-CCNC: 23 U/L
ANION GAP SERPL CALC-SCNC: 6 MMOL/L (ref 0–18)
AST SERPL-CCNC: 9 U/L (ref 15–37)
BASOPHILS # BLD AUTO: 0.03 X10(3) UL (ref 0–0.2)
BASOPHILS NFR BLD AUTO: 0.4 %
BILIRUB SERPL-MCNC: 0.5 MG/DL (ref 0.1–2)
BUN BLD-MCNC: 13 MG/DL (ref 7–18)
BUN/CREAT SERPL: 26 (ref 10–20)
CALCIUM BLD-MCNC: 8.9 MG/DL (ref 8.5–10.1)
CHLORIDE SERPL-SCNC: 105 MMOL/L (ref 98–112)
CHOLEST SERPL-MCNC: 153 MG/DL (ref ?–200)
CO2 SERPL-SCNC: 30 MMOL/L (ref 21–32)
CREAT BLD-MCNC: 0.5 MG/DL
DEPRECATED RDW RBC AUTO: 48.3 FL (ref 35.1–46.3)
EOSINOPHIL # BLD AUTO: 0.02 X10(3) UL (ref 0–0.7)
EOSINOPHIL NFR BLD AUTO: 0.3 %
ERYTHROCYTE [DISTWIDTH] IN BLOOD BY AUTOMATED COUNT: 14.1 % (ref 11–15)
EST. AVERAGE GLUCOSE BLD GHB EST-MCNC: 134 MG/DL (ref 68–126)
FASTING PATIENT LIPID ANSWER: YES
FASTING STATUS PATIENT QL REPORTED: YES
GLOBULIN PLAS-MCNC: 3.4 G/DL (ref 2.8–4.4)
GLUCOSE BLD-MCNC: 92 MG/DL (ref 70–99)
HBA1C MFR BLD: 6.3 % (ref ?–5.7)
HCT VFR BLD AUTO: 36.6 %
HDLC SERPL-MCNC: 53 MG/DL (ref 40–59)
HGB BLD-MCNC: 11.5 G/DL
IMM GRANULOCYTES # BLD AUTO: 0.1 X10(3) UL (ref 0–1)
IMM GRANULOCYTES NFR BLD: 1.4 %
LDLC SERPL CALC-MCNC: 85 MG/DL (ref ?–100)
LYMPHOCYTES # BLD AUTO: 0.97 X10(3) UL (ref 1–4)
LYMPHOCYTES NFR BLD AUTO: 13.6 %
MCHC RBC AUTO-ENTMCNC: 31.4 G/DL (ref 31–37)
MCV RBC AUTO: 95.3 FL
MONOCYTES # BLD AUTO: 0.43 X10(3) UL (ref 0.1–1)
MONOCYTES NFR BLD AUTO: 6 %
NEUTROPHILS # BLD AUTO: 5.57 X10 (3) UL (ref 1.5–7.7)
NEUTROPHILS # BLD AUTO: 5.57 X10(3) UL (ref 1.5–7.7)
NEUTROPHILS NFR BLD AUTO: 78.3 %
NONHDLC SERPL-MCNC: 100 MG/DL (ref ?–130)
OSMOLALITY SERPL CALC.SUM OF ELEC: 292 MOSM/KG (ref 275–295)
PLATELET # BLD AUTO: 297 10(3)UL (ref 150–450)
POTASSIUM SERPL-SCNC: 3.6 MMOL/L (ref 3.5–5.1)
PROT SERPL-MCNC: 6.8 G/DL (ref 6.4–8.2)
RBC # BLD AUTO: 3.84 X10(6)UL
SODIUM SERPL-SCNC: 141 MMOL/L (ref 136–145)
TRIGL SERPL-MCNC: 81 MG/DL (ref 30–149)
TSI SER-ACNC: 0.94 MIU/ML (ref 0.36–3.74)
VIT B12 SERPL-MCNC: 292 PG/ML (ref 193–986)
VIT D+METAB SERPL-MCNC: 14.2 NG/ML (ref 30–100)
VLDLC SERPL CALC-MCNC: 13 MG/DL (ref 0–30)
WBC # BLD AUTO: 7.1 X10(3) UL (ref 4–11)

## 2022-02-07 PROCEDURE — 97112 NEUROMUSCULAR REEDUCATION: CPT

## 2022-02-07 PROCEDURE — 83036 HEMOGLOBIN GLYCOSYLATED A1C: CPT

## 2022-02-07 PROCEDURE — 84443 ASSAY THYROID STIM HORMONE: CPT

## 2022-02-07 PROCEDURE — 80061 LIPID PANEL: CPT

## 2022-02-07 PROCEDURE — 82607 VITAMIN B-12: CPT

## 2022-02-07 PROCEDURE — 80053 COMPREHEN METABOLIC PANEL: CPT

## 2022-02-07 PROCEDURE — 85025 COMPLETE CBC W/AUTO DIFF WBC: CPT

## 2022-02-07 PROCEDURE — 36415 COLL VENOUS BLD VENIPUNCTURE: CPT

## 2022-02-07 PROCEDURE — 82306 VITAMIN D 25 HYDROXY: CPT

## 2022-02-07 NOTE — PROGRESS NOTES
Date  1/20/22 1/25/22 1/27/22 1/31/22 2/7/22       Visit Number 2/10 3/10 4/10 5/10 6/10       NMR x x x x x       Ther EX            Ther Act            Gait Training            CRM             Manual              Dx: Dizziness, gait Instability         Insurance:  Medicare    Visit # authorized:  10 per POC  Referring MD: Golden Almodovar   Precautions: fall risk  Medication Changes since last visit?: Yes- did not take BP med x 3 days. Subjective: Pt. Reports that dizziness is still present but improved over past few days. Pt. Stopped taking HTN medication, as she thought this was causing her symptoms. She has been monitoring her BP and states that it is normal.  She reports that she feels better. Pt. Reports that she has been exercising but not getting out much due to symptoms and weather. Objective:   Neuromuscular re-education:  40 min  NuStep seat 9, level 3, 10 min  Rebounder 7 lb ball x 30  Four square stepping x 6 ea way, emphasis on increased speed  F/B gait with turns 40 ft x 4  Braiding 25 ft ea way  Rockerboard L/R, A/P with SBA  Tap ups with 2 cones x 10 ea LE, SBA    Patient Education: Reviewed HEP, no changes today. Discussed monitoring BP closely and contacting MD re: medication changes. Assessment: Pt. Tolerated session well, reported dizziness up to 4/10 at end of session. Plan for next few sessions: Monitor symptoms.   Try eye/head coordination tasks, eyes closed    Charges: NMR x 3     Total Timed Treatment: 40 min Total Treatment Time: 40 min

## 2022-02-07 NOTE — TELEPHONE ENCOUNTER
If you can contact patient and let her know that I reviewed her blood work. Kidney and liver test are normal.  Her ESR is slightly elevated at 41, this could be due to inflammation. We will continue to monitor her on methotrexate.   She does not have mychart

## 2022-02-08 ENCOUNTER — APPOINTMENT (OUTPATIENT)
Dept: PHYSICAL THERAPY | Facility: HOSPITAL | Age: 67
End: 2022-02-08
Attending: Other
Payer: COMMERCIAL

## 2022-02-09 ENCOUNTER — OFFICE VISIT (OUTPATIENT)
Dept: PHYSICAL THERAPY | Facility: HOSPITAL | Age: 67
End: 2022-02-09
Attending: Other
Payer: MEDICARE

## 2022-02-09 PROCEDURE — 97112 NEUROMUSCULAR REEDUCATION: CPT

## 2022-02-09 NOTE — PROGRESS NOTES
Date  1/20/22 1/25/22 1/27/22 1/31/22 2/7/22 2/9/22      Visit Number 2/10 3/10 4/10 5/10 6/10 7/10      NMR x x x x x x      Ther EX            Ther Act            Gait Training            CRM             Manual              Dx: Dizziness, gait Instability         Insurance:  Medicare    Visit # authorized:  10 per POC  Referring MD: Yuriy Hagen   Precautions: fall risk  Medication Changes since last visit?: No- still off of BP meds  Subjective: Pt. Reports that dizziness improved- pt. Reports feeling much better. She has been monitoring BP TID and it has been normal- 129/75 this AM.  Pt. Reports that she has been exercising but not getting out much due to symptoms and weather. Dizziness 2/10 today. Objective:   Neuromuscular re-education:  40 min  NuStep seat 9, level 4, 10 min  Four square stepping x 6 ea way, emphasis on increased speed  Step stance x 30 sec ea EO and EC  Rockerboard L/R, A/P with SBA  Step stance rebounder x 25 reps, 7 lb ball  Tap ups with 3 cones x 10 ea LE, SBA  VOR cx- standing x 30 sec, no dizziness  Agility rings- Stepping F/B with one foot stationary    Patient Education: Reviewed HEP, no changes today. Encouraged pt. To try to get out into community and monitor her symptoms. Assessment: Pt. Tolerated session well, reported dizziness remained 2/10 throughout session. Plan for next few sessions: Monitor symptoms. Consider D/C if pt's symptoms continue to improve.     Charges: NMR x 3     Total Timed Treatment: 40 min Total Treatment Time: 40 min

## 2022-02-11 ENCOUNTER — APPOINTMENT (OUTPATIENT)
Dept: PHYSICAL THERAPY | Facility: HOSPITAL | Age: 67
End: 2022-02-11
Attending: Other
Payer: COMMERCIAL

## 2022-02-14 ENCOUNTER — OFFICE VISIT (OUTPATIENT)
Dept: PHYSICAL THERAPY | Facility: HOSPITAL | Age: 67
End: 2022-02-14
Attending: Other
Payer: MEDICARE

## 2022-02-14 PROCEDURE — 97112 NEUROMUSCULAR REEDUCATION: CPT

## 2022-02-14 NOTE — PROGRESS NOTES
Date  1/20/22 1/25/22 1/27/22 1/31/22 2/7/22 2/9/22 2/14/22     Visit Number 2/10 3/10 4/10 5/10 6/10 7/10 8/10     NMR x x x x x x x     Ther EX            Ther Act            Gait Training            CRM             Manual              Dx: Dizziness, gait Instability         Insurance:  Medicare    Visit # authorized:  10 per POC  Referring MD: Uriel Edge   Precautions: fall risk  Medication Changes since last visit?: No- still off of BP meds  Subjective: Pt. Reports that dizziness and balance improved- pt. Reports feeling much better. Pt. Reports not having any dizziness or imbalance all weekend. Pt. Reports that she was able to do some shopping this weekend without symptoms. Dizziness 4/10 for a brief time this AM, none currently. Objective:   Neuromuscular re-education:  40 min  NuStep seat 9, level 4, 10 min  Four square stepping x 6 ea way, emphasis on increased speed  Step stance x 30 sec ea EO and EC  Diagonal weightshift EC x 10 ea  Rockerboard L/R, A/P with SBA  Agility ladder:  Stepping in each square, sidestepping,   Tap ups with 3 cones x 10 ea LE, SBA    Patient Education: Reviewed HEP, no changes today. Encouraged pt. To try to get out into community and monitor her symptoms. Assessment: Pt. Tolerated session well, reported no dizziness during session. Plan for next few sessions: Monitor symptoms. Consider D/C if pt's symptoms continue to improve.     Charges: NMR x 3     Total Timed Treatment: 40 min Total Treatment Time: 40 min

## 2022-02-15 ENCOUNTER — APPOINTMENT (OUTPATIENT)
Dept: PHYSICAL THERAPY | Facility: HOSPITAL | Age: 67
End: 2022-02-15
Attending: Other
Payer: COMMERCIAL

## 2022-02-16 ENCOUNTER — APPOINTMENT (OUTPATIENT)
Dept: PHYSICAL THERAPY | Facility: HOSPITAL | Age: 67
End: 2022-02-16
Attending: Other
Payer: MEDICARE

## 2022-02-18 ENCOUNTER — APPOINTMENT (OUTPATIENT)
Dept: PHYSICAL THERAPY | Facility: HOSPITAL | Age: 67
End: 2022-02-18
Attending: Other
Payer: COMMERCIAL

## 2022-02-18 ENCOUNTER — OFFICE VISIT (OUTPATIENT)
Dept: INTERNAL MEDICINE CLINIC | Facility: CLINIC | Age: 67
End: 2022-02-18
Payer: MEDICARE

## 2022-02-18 ENCOUNTER — TELEPHONE (OUTPATIENT)
Dept: INTERNAL MEDICINE CLINIC | Facility: CLINIC | Age: 67
End: 2022-02-18

## 2022-02-18 VITALS — WEIGHT: 175.63 LBS | OXYGEN SATURATION: 99 % | HEIGHT: 64 IN | BODY MASS INDEX: 29.98 KG/M2

## 2022-02-18 DIAGNOSIS — M06.00 SERONEGATIVE RHEUMATOID ARTHRITIS (HCC): Primary | ICD-10-CM

## 2022-02-18 DIAGNOSIS — D64.9 ANEMIA, UNSPECIFIED TYPE: ICD-10-CM

## 2022-02-18 DIAGNOSIS — Z12.11 COLON CANCER SCREENING: ICD-10-CM

## 2022-02-18 DIAGNOSIS — R53.83 FATIGUE, UNSPECIFIED TYPE: ICD-10-CM

## 2022-02-18 DIAGNOSIS — E11.9 TYPE 2 DIABETES MELLITUS WITHOUT COMPLICATION, WITHOUT LONG-TERM CURRENT USE OF INSULIN (HCC): ICD-10-CM

## 2022-02-18 DIAGNOSIS — R42 DIZZY: ICD-10-CM

## 2022-02-18 PROBLEM — I10 ESSENTIAL HYPERTENSION: Status: ACTIVE | Noted: 2022-02-18

## 2022-02-18 LAB
ANION GAP SERPL CALC-SCNC: 3 MMOL/L (ref 0–18)
BASOPHILS # BLD AUTO: 0.02 X10(3) UL (ref 0–0.2)
BASOPHILS NFR BLD AUTO: 0.3 %
BUN BLD-MCNC: 15 MG/DL (ref 7–18)
BUN/CREAT SERPL: 28.3 (ref 10–20)
CALCIUM BLD-MCNC: 9.6 MG/DL (ref 8.5–10.1)
CO2 SERPL-SCNC: 33 MMOL/L (ref 21–32)
CREAT BLD-MCNC: 0.53 MG/DL
DEPRECATED HBV CORE AB SER IA-ACNC: 109.5 NG/ML
DEPRECATED RDW RBC AUTO: 50.3 FL (ref 35.1–46.3)
EOSINOPHIL # BLD AUTO: 0.03 X10(3) UL (ref 0–0.7)
EOSINOPHIL NFR BLD AUTO: 0.4 %
ERYTHROCYTE [DISTWIDTH] IN BLOOD BY AUTOMATED COUNT: 14 % (ref 11–15)
FASTING STATUS PATIENT QL REPORTED: NO
GLUCOSE BLD-MCNC: 88 MG/DL (ref 70–99)
HCT VFR BLD AUTO: 38.8 %
HGB BLD-MCNC: 12.1 G/DL
IMM GRANULOCYTES # BLD AUTO: 0.08 X10(3) UL (ref 0–1)
IMM GRANULOCYTES NFR BLD: 1.1 %
IRON SATN MFR SERPL: 15 %
IRON SERPL-MCNC: 46 UG/DL
LYMPHOCYTES # BLD AUTO: 1.06 X10(3) UL (ref 1–4)
LYMPHOCYTES NFR BLD AUTO: 14.9 %
MCH RBC QN AUTO: 30.3 PG (ref 26–34)
MCHC RBC AUTO-ENTMCNC: 31.2 G/DL (ref 31–37)
MCV RBC AUTO: 97 FL
MONOCYTES # BLD AUTO: 0.48 X10(3) UL (ref 0.1–1)
MONOCYTES NFR BLD AUTO: 6.8 %
NEUTROPHILS # BLD AUTO: 5.43 X10 (3) UL (ref 1.5–7.7)
NEUTROPHILS # BLD AUTO: 5.43 X10(3) UL (ref 1.5–7.7)
NEUTROPHILS NFR BLD AUTO: 76.5 %
OSMOLALITY SERPL CALC.SUM OF ELEC: 286 MOSM/KG (ref 275–295)
PLATELET # BLD AUTO: 159 10(3)UL (ref 150–450)
POTASSIUM SERPL-SCNC: 3.6 MMOL/L (ref 3.5–5.1)
RBC # BLD AUTO: 4 X10(6)UL
SODIUM SERPL-SCNC: 138 MMOL/L (ref 136–145)
TIBC SERPL-MCNC: 299 UG/DL (ref 240–450)
TRANSFERRIN SERPL-MCNC: 201 MG/DL (ref 200–360)
WBC # BLD AUTO: 7.1 X10(3) UL (ref 4–11)

## 2022-02-18 PROCEDURE — 99214 OFFICE O/P EST MOD 30 MIN: CPT | Performed by: NURSE PRACTITIONER

## 2022-02-18 RX ORDER — LANCETS 30 GAUGE
EACH MISCELLANEOUS
Qty: 100 EACH | Refills: 6 | Status: SHIPPED | OUTPATIENT
Start: 2022-02-18

## 2022-02-18 RX ORDER — BLOOD SUGAR DIAGNOSTIC
STRIP MISCELLANEOUS
Qty: 100 STRIP | Refills: 0 | Status: SHIPPED | OUTPATIENT
Start: 2022-02-18

## 2022-02-18 RX ORDER — BLOOD-GLUCOSE METER
EACH MISCELLANEOUS
Qty: 1 KIT | Refills: 0 | Status: SHIPPED | OUTPATIENT
Start: 2022-02-18

## 2022-02-18 RX ORDER — ERGOCALCIFEROL 1.25 MG/1
50000 CAPSULE ORAL WEEKLY
COMMUNITY
Start: 2022-02-11

## 2022-02-21 ENCOUNTER — OFFICE VISIT (OUTPATIENT)
Dept: PHYSICAL THERAPY | Facility: HOSPITAL | Age: 67
End: 2022-02-21
Attending: Other
Payer: MEDICARE

## 2022-02-21 PROCEDURE — 97112 NEUROMUSCULAR REEDUCATION: CPT

## 2022-02-21 NOTE — PROGRESS NOTES
Date  1/20/22 1/25/22 1/27/22 1/31/22 2/7/22 2/9/22 2/14/22 2/21/22    Visit Number 2/10 3/10 4/10 5/10 6/10 7/10 8/10 9/10    NMR x x x x x x x x    Ther EX            Ther Act            Gait Training            CRM             Manual              Dx: Dizziness, gait Instability         Insurance:  Medicare    Visit # authorized:  10 per POC  Referring MD: Golden Almodovar   Precautions: fall risk  Medication Changes since last visit?: No- still off of BP meds  Subjective: Pt. Reports that dizziness and balance improved- pt. Reports feeling much better. Pt. Reports not having any dizziness or imbalance all weekend, except for two brief dizziness/lightheadedness. Once was bending low to reach something, and once was turning around after reaching into refrigerator. Both times symptoms were only momentary. Pt. Saw new PCP on Friday, who ordered Meclazine for pt. Pt. Has not taken it yet. No dizziness today. Objective:   Neuromuscular re-education:  40 min  NuStep seat 9, level 4, 10 min  Standing trunk rotation x 10 ea way, focus on target  VOR cx standing x 10 ea way  Gait with horizontal and vertical head turns, 2 x 40 ft ea  Picking up objects from floor x 6, sup   F/B gait with turns 40 ft x 2    Patient Education: Issued revised written HEP. Discussed increasing head movement and also monitoring     Assessment: Pt. Tolerated session well, reported no dizziness during session, even with more rapid movement and eye/head coordination tasks. Unsure if the symptoms pt. Experienced over weekend were due to vestibular etiology. Pt. Is also going to monitor blood sugar levels when symptoms occur to see if this may also be a cause. Plan for next few sessions:   Consider D/C if pt's symptoms continue to improve.     Charges: NMR x 3     Total Timed Treatment: 40 min Total Treatment Time: 40 min

## 2022-02-21 NOTE — PROGRESS NOTES
Pt informed of lab results, pt states she still is lightheaded and would like for you to call her back on cell phone.

## 2022-02-21 NOTE — TELEPHONE ENCOUNTER
[facility] in [facility]# [phone] closed today due to holiday, will call tomorrow.

## 2022-02-22 NOTE — TELEPHONE ENCOUNTER
[facility] to obtain fax#.      Faxed over to clinic request for a copy of most recent eye exam.

## 2022-02-23 ENCOUNTER — OFFICE VISIT (OUTPATIENT)
Dept: PHYSICAL THERAPY | Facility: HOSPITAL | Age: 67
End: 2022-02-23
Attending: Other
Payer: MEDICARE

## 2022-02-23 PROCEDURE — 97116 GAIT TRAINING THERAPY: CPT

## 2022-02-23 PROCEDURE — 97530 THERAPEUTIC ACTIVITIES: CPT

## 2022-02-23 NOTE — PROGRESS NOTES
Patient Name: Trinidad Blair, : 1955, MRN: [id number]   Date:  2022  Referring Physician:  Viviana Kate    Diagnosis: Dizziness    Discharge Summary  Pt has attended 10 visits in physical therapy. Progress Note Start Date: 22  End Date: 22    Subjective: Pt. Reports that PT has helped her balance, has allowed pt. To return to community participation. Pt. Reports that her current symptom of dizziness is different than when she started, but attributes change due to BP medicine. Pt. Reports that she stopped taking one of her medications (MD is aware) and her dizziness improved significantly. Pt. Reports currently she has occ lightheadedness, not brought on by movement, seems to come and go. The lightheadedness feels different than the dizziness that pt. Had before. Pt. Reports that she is not getting dizzy with her exercises now. Assessment: Pt. Has made significant improvement in functional mobility and reported symptoms. She is improved In all of her objective functional mobility testing. She is no longer testing as a fall risk with gait testing. Her dizziness is not reproduced by any head movement or eye/head coordination task. Pt. Has had VNG testing in the past which was WNL. The current lightheadedness that pt. Is experiencing is likely not due to a vestibular etiology. Pt. Is indep in all home exercises and has been encouraged to continue to exercise in order to maintain functional gains. No indication for further PT at this time. Objective: Therapeutic Activities:  15 min  Timed Up and Go: 12 sec (was 12.7 sec)   Five Times Sit to Stand Test: 13.5 sec (was 21 sec)  Romberg EO and EC x 30 sec ea  SLS:  10 sec ea LE    Gait Trainin min  Functional Gait Assessment   Item Description Score (0 worst, 3 best)    ___3____1. Gait Level Surface-  Time: ____5.5______seconds  ___3____2. Change in gait speed  ___3____3. Gait with horizontal head turns   ___3____4. Gait with vertical head turns  ___3____5. Gait and pivot turn  ___3____6. Step over obstacle  ___3____7. Gait with narrow base of support-  # of steps__10______  ____2___8. Gait with eyes closed-  Time: _____7_____seconds  ___2____9. Ambulating backward  ___2____10. Steps    TOTAL SCORE: __27___/30  (was 19/30)  (less than 22/30 = fall risk)      Goals    1. Pt. Able to ambulate with visual scanning without path deviation, indep. (GOAL MET)  2. Improved five times sit to stand test to less than 14 sec to reflect an improvement in balance. (GOAL MET)  3. Pt. Able to reach high and bend to  objects from the floor without dizziness. (GOAL MET)         Charges: TA x 1, Gait x 2      Total Timed Treatment: 40 min  Total Treatment Time: 40 min    Plan: Discharge from PT with pt. To continue with home program as prescribed in order to maintain functional gains. Thank you for your referral. If you have any questions, please contact me at 21 [phone].     Sincerely,  Radha Cordon PT, PRAMOD    Electronically signed by therapist:  Radha Cordon PT, NCS

## 2022-03-14 ENCOUNTER — TELEPHONE (OUTPATIENT)
Dept: INTERNAL MEDICINE CLINIC | Facility: CLINIC | Age: 67
End: 2022-03-14

## 2022-03-16 ENCOUNTER — OFFICE VISIT (OUTPATIENT)
Dept: RHEUMATOLOGY | Facility: CLINIC | Age: 67
End: 2022-03-16
Payer: MEDICARE

## 2022-03-16 VITALS
SYSTOLIC BLOOD PRESSURE: 113 MMHG | WEIGHT: 175 LBS | HEIGHT: 64 IN | BODY MASS INDEX: 29.88 KG/M2 | HEART RATE: 105 BPM | DIASTOLIC BLOOD PRESSURE: 75 MMHG

## 2022-03-16 DIAGNOSIS — Z51.81 MEDICATION MONITORING ENCOUNTER: ICD-10-CM

## 2022-03-16 DIAGNOSIS — M06.00 SERONEGATIVE RHEUMATOID ARTHRITIS (HCC): Primary | ICD-10-CM

## 2022-03-16 PROCEDURE — 99214 OFFICE O/P EST MOD 30 MIN: CPT | Performed by: INTERNAL MEDICINE

## 2022-03-16 NOTE — PATIENT INSTRUCTIONS
You were seen today for rheumatoid arthritis  Symptoms are controlled  Continue methotrexate 10 pills weekly and folic acid every day  Stay off the prednisone  Blood work in May  Follow-up in May

## 2022-03-23 ENCOUNTER — TELEPHONE (OUTPATIENT)
Dept: INTERNAL MEDICINE CLINIC | Facility: CLINIC | Age: 67
End: 2022-03-23

## 2022-03-23 ENCOUNTER — OFFICE VISIT (OUTPATIENT)
Dept: INTERNAL MEDICINE CLINIC | Facility: CLINIC | Age: 67
End: 2022-03-23
Payer: MEDICARE

## 2022-03-23 VITALS
WEIGHT: 177.38 LBS | HEART RATE: 88 BPM | TEMPERATURE: 98 F | HEIGHT: 64 IN | DIASTOLIC BLOOD PRESSURE: 80 MMHG | SYSTOLIC BLOOD PRESSURE: 108 MMHG | BODY MASS INDEX: 30.28 KG/M2

## 2022-03-23 DIAGNOSIS — Z00.00 ENCOUNTER FOR ANNUAL HEALTH EXAMINATION: ICD-10-CM

## 2022-03-23 DIAGNOSIS — R42 DIZZY: ICD-10-CM

## 2022-03-23 DIAGNOSIS — D51.0 PERNICIOUS ANEMIA: ICD-10-CM

## 2022-03-23 DIAGNOSIS — M06.00 SERONEGATIVE RHEUMATOID ARTHRITIS (HCC): ICD-10-CM

## 2022-03-23 DIAGNOSIS — Z91.89 AT RISK FOR DECREASED BONE DENSITY: ICD-10-CM

## 2022-03-23 DIAGNOSIS — R93.7 ABNORMAL BONE DENSITY SCREENING: ICD-10-CM

## 2022-03-23 DIAGNOSIS — Z71.85 VACCINE COUNSELING: ICD-10-CM

## 2022-03-23 DIAGNOSIS — R42 VERTIGO: ICD-10-CM

## 2022-03-23 DIAGNOSIS — R94.31 ABNORMAL EKG: ICD-10-CM

## 2022-03-23 DIAGNOSIS — Z00.00 MEDICARE ANNUAL WELLNESS VISIT, INITIAL: Primary | ICD-10-CM

## 2022-03-23 DIAGNOSIS — I10 ESSENTIAL HYPERTENSION: ICD-10-CM

## 2022-03-23 DIAGNOSIS — E55.9 VITAMIN D DEFICIENCY: ICD-10-CM

## 2022-03-23 DIAGNOSIS — Z78.0 POSTMENOPAUSAL: ICD-10-CM

## 2022-03-23 DIAGNOSIS — E11.9 TYPE 2 DIABETES MELLITUS WITHOUT COMPLICATION, WITHOUT LONG-TERM CURRENT USE OF INSULIN (HCC): ICD-10-CM

## 2022-03-23 DIAGNOSIS — E78.6 FAMILIAL LIPOPROTEIN DEFICIENCY: ICD-10-CM

## 2022-03-23 PROBLEM — Z90.711 HISTORY OF PARTIAL HYSTERECTOMY: Status: ACTIVE | Noted: 2022-03-23

## 2022-03-23 LAB
APPEARANCE: CLEAR
BILIRUBIN: NEGATIVE
CREAT UR-SCNC: 44.9 MG/DL
GLUCOSE (URINE DIPSTICK): NEGATIVE MG/DL
KETONES (URINE DIPSTICK): NEGATIVE MG/DL
LEUKOCYTES: NEGATIVE
MICROALBUMIN UR-MCNC: 0.51 MG/DL
MICROALBUMIN/CREAT 24H UR-RTO: 11.4 UG/MG (ref ?–30)
MULTISTIX LOT#: NORMAL NUMERIC
NITRITE, URINE: NEGATIVE
OCCULT BLOOD: NEGATIVE
PH, URINE: 7 (ref 4.5–8)
PROTEIN (URINE DIPSTICK): NEGATIVE MG/DL
SPECIFIC GRAVITY: 1.02 (ref 1–1.03)
URINE-COLOR: YELLOW
UROBILINOGEN,SEMI-QN: 0.2 MG/DL (ref 0–1.9)

## 2022-03-23 PROCEDURE — G0405 EKG INTERPRET & REPORT PREVE: HCPCS | Performed by: NURSE PRACTITIONER

## 2022-03-23 PROCEDURE — G0402 INITIAL PREVENTIVE EXAM: HCPCS | Performed by: NURSE PRACTITIONER

## 2022-03-23 PROCEDURE — 81003 URINALYSIS AUTO W/O SCOPE: CPT | Performed by: NURSE PRACTITIONER

## 2022-03-24 ENCOUNTER — TELEPHONE (OUTPATIENT)
Dept: FAMILY MEDICINE CLINIC | Facility: CLINIC | Age: 67
End: 2022-03-24

## 2022-03-25 ENCOUNTER — TELEPHONE (OUTPATIENT)
Dept: INTERNAL MEDICINE CLINIC | Facility: CLINIC | Age: 67
End: 2022-03-25

## 2022-03-25 NOTE — TELEPHONE ENCOUNTER
Spoke to Sharon Mart and advised her Dawood's note. She verbalized understanding. Everything went though.

## 2022-03-25 NOTE — TELEPHONE ENCOUNTER
Per Conor Barreto, patient has an appointment on 03/30/2022 for Cardio echo and did not pass the diagnosis code.

## 2022-03-25 NOTE — TELEPHONE ENCOUNTER
Liliya perdue at [facility] is requesting new order for 2d echo. Stated it does not pass for medical necessity and she would like Roro Robles to write a new order. Patient is scheduled on 3/30.

## 2022-03-30 ENCOUNTER — HOSPITAL ENCOUNTER (OUTPATIENT)
Dept: CV DIAGNOSTICS | Facility: HOSPITAL | Age: 67
Discharge: HOME | End: 2022-03-30
Attending: NURSE PRACTITIONER
Payer: MEDICARE

## 2022-03-30 DIAGNOSIS — I10 ESSENTIAL HYPERTENSION: ICD-10-CM

## 2022-03-30 DIAGNOSIS — R94.31 ABNORMAL EKG: ICD-10-CM

## 2022-03-30 PROCEDURE — 93306 TTE W/DOPPLER COMPLETE: CPT | Performed by: NURSE PRACTITIONER

## 2022-03-30 PROCEDURE — 93227 XTRNL ECG REC<48 HR R&I: CPT | Performed by: NURSE PRACTITIONER

## 2022-03-30 PROCEDURE — 93225 XTRNL ECG REC<48 HRS REC: CPT | Performed by: NURSE PRACTITIONER

## 2022-04-01 NOTE — TELEPHONE ENCOUNTER
patient asked what other tests she needs done, and noticed no one communicated with her regarding colon FIT screening test.     Explained the process; she will  kit at lab, should be given instructions how to collect, and then return to lab when finish collection.

## 2022-04-05 ENCOUNTER — TELEPHONE (OUTPATIENT)
Dept: RHEUMATOLOGY | Facility: CLINIC | Age: 67
End: 2022-04-05

## 2022-04-05 NOTE — TELEPHONE ENCOUNTER
Dr. Vannesa Lowery, patient is asking if she can take ibuprofen while being on methotrexate. Please advise.

## 2022-04-05 NOTE — TELEPHONE ENCOUNTER
Pt calling with question on since she is currently on methotrexate, is she still ok to take ibuprofen 600 MG Oral Tab when her pain gets real bad because she states she would take the ibuprofen before being on the methotrexate. Please advise.

## 2022-04-15 ENCOUNTER — OFFICE VISIT (OUTPATIENT)
Dept: SLEEP CENTER | Age: 67
End: 2022-04-15
Attending: NURSE PRACTITIONER
Payer: MEDICARE

## 2022-04-15 DIAGNOSIS — R53.83 FATIGUE, UNSPECIFIED TYPE: ICD-10-CM

## 2022-04-15 PROCEDURE — 95810 POLYSOM 6/> YRS 4/> PARAM: CPT

## 2022-04-21 PROBLEM — G47.33 MILD OBSTRUCTIVE SLEEP APNEA: Status: ACTIVE | Noted: 2022-04-21

## 2022-04-26 ENCOUNTER — ORDER TRANSCRIPTION (OUTPATIENT)
Dept: SLEEP CENTER | Age: 67
End: 2022-04-26

## 2022-05-09 ENCOUNTER — HOSPITAL ENCOUNTER (OUTPATIENT)
Dept: BONE DENSITY | Facility: HOSPITAL | Age: 67
Discharge: HOME | End: 2022-05-09
Attending: NURSE PRACTITIONER
Payer: MEDICARE

## 2022-05-09 ENCOUNTER — LAB ENCOUNTER (OUTPATIENT)
Dept: LAB | Facility: HOSPITAL | Age: 67
End: 2022-05-09
Attending: NURSE PRACTITIONER
Payer: MEDICARE

## 2022-05-09 DIAGNOSIS — Z91.89 AT RISK FOR DECREASED BONE DENSITY: ICD-10-CM

## 2022-05-09 DIAGNOSIS — M06.00 SERONEGATIVE RHEUMATOID ARTHRITIS (HCC): ICD-10-CM

## 2022-05-09 DIAGNOSIS — Z78.0 POSTMENOPAUSAL: ICD-10-CM

## 2022-05-09 DIAGNOSIS — Z12.11 ENCOUNTER FOR FIT (FECAL IMMUNOCHEMICAL TEST) SCREENING: ICD-10-CM

## 2022-05-09 DIAGNOSIS — Z51.81 MEDICATION MONITORING ENCOUNTER: ICD-10-CM

## 2022-05-09 LAB
ALBUMIN SERPL-MCNC: 3.2 G/DL (ref 3.4–5)
ALT SERPL-CCNC: 32 U/L
AST SERPL-CCNC: 20 U/L (ref 15–37)
BASOPHILS # BLD AUTO: 0.01 X10(3) UL (ref 0–0.2)
BASOPHILS NFR BLD AUTO: 0.2 %
CREAT BLD-MCNC: 0.65 MG/DL
CRP SERPL-MCNC: 1.3 MG/DL (ref ?–0.3)
DEPRECATED RDW RBC AUTO: 46.5 FL (ref 35.1–46.3)
EOSINOPHIL # BLD AUTO: 0.06 X10(3) UL (ref 0–0.7)
EOSINOPHIL NFR BLD AUTO: 0.9 %
ERYTHROCYTE [DISTWIDTH] IN BLOOD BY AUTOMATED COUNT: 13.7 % (ref 11–15)
ERYTHROCYTE [SEDIMENTATION RATE] IN BLOOD: 47 MM/HR
HCT VFR BLD AUTO: 38.3 %
HEMOCCULT STL QL: NEGATIVE
HGB BLD-MCNC: 11.9 G/DL
IMM GRANULOCYTES # BLD AUTO: 0.03 X10(3) UL (ref 0–1)
IMM GRANULOCYTES NFR BLD: 0.5 %
LYMPHOCYTES # BLD AUTO: 1.16 X10(3) UL (ref 1–4)
LYMPHOCYTES NFR BLD AUTO: 17.7 %
MCH RBC QN AUTO: 28.9 PG (ref 26–34)
MCHC RBC AUTO-ENTMCNC: 31.1 G/DL (ref 31–37)
MCV RBC AUTO: 93 FL
MONOCYTES # BLD AUTO: 0.51 X10(3) UL (ref 0.1–1)
MONOCYTES NFR BLD AUTO: 7.8 %
NEUTROPHILS # BLD AUTO: 4.77 X10 (3) UL (ref 1.5–7.7)
NEUTROPHILS # BLD AUTO: 4.77 X10(3) UL (ref 1.5–7.7)
NEUTROPHILS NFR BLD AUTO: 72.9 %
PLATELET # BLD AUTO: 308 10(3)UL (ref 150–450)
RBC # BLD AUTO: 4.12 X10(6)UL
WBC # BLD AUTO: 6.5 X10(3) UL (ref 4–11)

## 2022-05-09 PROCEDURE — 82040 ASSAY OF SERUM ALBUMIN: CPT

## 2022-05-09 PROCEDURE — 84460 ALANINE AMINO (ALT) (SGPT): CPT

## 2022-05-09 PROCEDURE — 85025 COMPLETE CBC W/AUTO DIFF WBC: CPT

## 2022-05-09 PROCEDURE — 77080 DXA BONE DENSITY AXIAL: CPT | Performed by: NURSE PRACTITIONER

## 2022-05-09 PROCEDURE — 82274 ASSAY TEST FOR BLOOD FECAL: CPT

## 2022-05-09 PROCEDURE — 86140 C-REACTIVE PROTEIN: CPT

## 2022-05-09 PROCEDURE — 85652 RBC SED RATE AUTOMATED: CPT

## 2022-05-09 PROCEDURE — 84450 TRANSFERASE (AST) (SGOT): CPT

## 2022-05-09 PROCEDURE — 82565 ASSAY OF CREATININE: CPT

## 2022-05-09 PROCEDURE — 36415 COLL VENOUS BLD VENIPUNCTURE: CPT

## 2022-05-10 ENCOUNTER — TELEPHONE (OUTPATIENT)
Dept: RHEUMATOLOGY | Facility: CLINIC | Age: 67
End: 2022-05-10

## 2022-05-10 NOTE — TELEPHONE ENCOUNTER
----- Message from Mona Barrera MD sent at 5/10/2022  8:38 AM CDT -----  If you can let patient know that I reviewed her blood work.   Kidney and liver tests were normal.  Inflammation markers, ESR and CRP are still elevated    Dr. Guan Slight

## 2022-05-10 NOTE — PROGRESS NOTES
Message left for pt that stool fit card is negative, pt informed she is due for mammogram and to please schedule test.

## 2022-05-10 NOTE — TELEPHONE ENCOUNTER
Patient made aware of note below. Has follow up scheduled. She stated that she has defiantly had increased inflammation since stopping the prednisone. Having joint pain daily, but no acute swelling redness or heat. Will discuss further at visit.      Future Appointments   Date Time Provider Alfonzo Novoa   5/18/2022  2:40 PM Ioana Quinn MD [facility] OF THE [facility]   6/17/2022 12:00 PM [facility] OF THE [facility] COVID RESOURCE [facility] OF THE [facility] LAB EM [facility]   6/20/2022  8:30 PM [facility] SLEEP EM Sleep Ctr

## 2022-05-18 ENCOUNTER — OFFICE VISIT (OUTPATIENT)
Dept: RHEUMATOLOGY | Facility: CLINIC | Age: 67
End: 2022-05-18
Payer: MEDICARE

## 2022-05-18 VITALS
HEIGHT: 64 IN | HEART RATE: 89 BPM | BODY MASS INDEX: 29.71 KG/M2 | SYSTOLIC BLOOD PRESSURE: 107 MMHG | DIASTOLIC BLOOD PRESSURE: 74 MMHG | WEIGHT: 174 LBS | RESPIRATION RATE: 16 BRPM

## 2022-05-18 DIAGNOSIS — Z51.81 MEDICATION MONITORING ENCOUNTER: ICD-10-CM

## 2022-05-18 DIAGNOSIS — M06.00 SERONEGATIVE RHEUMATOID ARTHRITIS (HCC): Primary | ICD-10-CM

## 2022-05-18 PROCEDURE — 99214 OFFICE O/P EST MOD 30 MIN: CPT | Performed by: INTERNAL MEDICINE

## 2022-05-18 RX ORDER — METHOTREXATE 2.5 MG/1
25 TABLET ORAL
Qty: 130 TABLET | Refills: 1 | Status: SHIPPED | OUTPATIENT
Start: 2022-05-18

## 2022-05-18 RX ORDER — HYDROXYCHLOROQUINE SULFATE 200 MG/1
400 TABLET, FILM COATED ORAL DAILY
Qty: 180 TABLET | Refills: 0 | Status: SHIPPED | OUTPATIENT
Start: 2022-05-18

## 2022-05-18 RX ORDER — PREDNISONE 1 MG/1
5 TABLET ORAL DAILY
Qty: 90 TABLET | Refills: 0 | Status: SHIPPED | OUTPATIENT
Start: 2022-05-18

## 2022-05-18 RX ORDER — FOLIC ACID 1 MG/1
1 TABLET ORAL DAILY
Qty: 90 TABLET | Refills: 2 | Status: SHIPPED | OUTPATIENT
Start: 2022-05-18

## 2022-05-18 NOTE — PATIENT INSTRUCTIONS
You were seen today for rheumatoid arthritis  Continue methotrexate 10 pills weekly and folic acid every day  Plan to start you on Plaquenil, take 200 mg (1 pill) daily for a week then increase to 400 mg (2 pills) daily  Plan to also restart you on prednisone 5 mg daily which is 1 pill a day  Blood work in August  See me in August  I also placed a referral for ophthalmology as you will be starting Plaquenil see them in the next 3 to 6 months

## 2022-05-23 ENCOUNTER — TELEPHONE (OUTPATIENT)
Dept: RHEUMATOLOGY | Facility: CLINIC | Age: 67
End: 2022-05-23

## 2022-05-23 NOTE — TELEPHONE ENCOUNTER
Patient called to follow up on status of refill request. Patient stated per pharmacy prior authorization is needed. Please contact patient once this has been sent.      hydroxychloroquine 200 MG Oral Tab

## 2022-05-23 NOTE — TELEPHONE ENCOUNTER
Spoke to patient pharmacy and they were provided with ICD 10 code and it went through. Called patient and informed. She said she called the insurance to get it approved.

## 2022-06-17 ENCOUNTER — LAB ENCOUNTER (OUTPATIENT)
Dept: LAB | Facility: HOSPITAL | Age: 67
End: 2022-06-17
Attending: NURSE PRACTITIONER
Payer: MEDICARE

## 2022-06-17 ENCOUNTER — TELEPHONE (OUTPATIENT)
Dept: INTERNAL MEDICINE CLINIC | Facility: CLINIC | Age: 67
End: 2022-06-17

## 2022-06-17 ENCOUNTER — MED REC SCAN ONLY (OUTPATIENT)
Dept: INTERNAL MEDICINE CLINIC | Facility: CLINIC | Age: 67
End: 2022-06-17

## 2022-06-17 DIAGNOSIS — G47.33 OBSTRUCTIVE SLEEP APNEA (ADULT) (PEDIATRIC): ICD-10-CM

## 2022-06-17 LAB — SARS-COV-2 RNA RESP QL NAA+PROBE: NOT DETECTED

## 2022-06-20 ENCOUNTER — OFFICE VISIT (OUTPATIENT)
Dept: SLEEP CENTER | Age: 67
End: 2022-06-20
Attending: NURSE PRACTITIONER
Payer: MEDICARE

## 2022-06-20 DIAGNOSIS — G47.33 OBSTRUCTIVE SLEEP APNEA (ADULT) (PEDIATRIC): ICD-10-CM

## 2022-06-20 DIAGNOSIS — I10 HYPERTENSION: ICD-10-CM

## 2022-06-20 DIAGNOSIS — Z76.89 SLEEP CONCERN: Primary | ICD-10-CM

## 2022-06-20 PROCEDURE — 95811 POLYSOM 6/>YRS CPAP 4/> PARM: CPT

## 2022-07-15 RX ORDER — METFORMIN HYDROCHLORIDE 500 MG/1
TABLET, EXTENDED RELEASE ORAL
Qty: 90 TABLET | Refills: 0 | Status: SHIPPED | OUTPATIENT
Start: 2022-07-15 | End: 2022-10-24

## 2022-07-15 RX ORDER — HYDROCHLOROTHIAZIDE 12.5 MG/1
CAPSULE, GELATIN COATED ORAL
Qty: 90 CAPSULE | Refills: 0 | Status: SHIPPED | OUTPATIENT
Start: 2022-07-15 | End: 2022-10-24

## 2022-07-15 RX ORDER — ATORVASTATIN CALCIUM 20 MG/1
TABLET, FILM COATED ORAL
Qty: 90 TABLET | Refills: 0 | Status: SHIPPED | OUTPATIENT
Start: 2022-07-15 | End: 2022-10-24

## 2022-08-16 ENCOUNTER — TELEPHONE (OUTPATIENT)
Dept: INTERNAL MEDICINE CLINIC | Facility: CLINIC | Age: 67
End: 2022-08-16

## 2022-08-16 NOTE — TELEPHONE ENCOUNTER
Roro Robles, please advise    Patient (name and  verified) c/o ongoing lightheaded/dizziness symptoms but states the symptoms are the same. Offered to schedule an appt with Kit Marcelo however patient wanted to see the sleep specialist first. Patient is asking if the PCP can assist with getting the patient in earlier for Dr Sandra Dickson. Future Appointments   Date Time Provider Alfonzo Novoa   2022 12:00 PM Kendra Rodriguez MD  [facility] THE [facility]   2022  1:00 PM Yoli Traylor MD [facility] Eduin ALEXANDER     Patient denies chest pain or shortness of breath. Patient has had ongoing symptoms of lightheaded/dizziness states that they are the same and not getting better. Patient denies any new  complaints. Patient denies any headache. Patient states that she is lightheaded with position changes from sitting to standing. Patient states she holds onto furniture to walk around the house. Denies numbness to arms and legs, denies any weakness on the right or left side of the body. Patient is asking about the Status of the CPAP orders. Per chart review, on 22, patient had a titration sleep study completed.

## 2022-08-16 NOTE — TELEPHONE ENCOUNTER
Patient called stating that first appointment with Anna Jenkins isn't until 9/14/22. Would like to see if provider can help her get in sooner. Patient states she isn't doing well.

## 2022-08-17 NOTE — TELEPHONE ENCOUNTER
Sure, you can add her at 1 pm any day except 8/19. However, she should see primary for the symptoms of dizziness that she is having. Sounds like it could be orthostatic hypotension. This is not related to sleep apnea. I see the CPAP order was placed last month by primary.

## 2022-08-17 NOTE — TELEPHONE ENCOUNTER
Bethel HANSON-please see messages below. Can patient be added to your schedule?  Recent sleep study 6/23/22

## 2022-08-18 NOTE — TELEPHONE ENCOUNTER
Attempted to speak with patient multiple times, voicemail left x 2 to return call for appointment with Pulmonary.

## 2022-08-18 NOTE — TELEPHONE ENCOUNTER
Patient notified, verbalized understanding. Patient expressed frustration that no one is telling her anything and she doesn't feel like another visit with neuro will be helpful but she will go for follow up 'but that will be the last time. \"

## 2022-08-18 NOTE — TELEPHONE ENCOUNTER
Please advise pt to follow up w/ pulmo is for the sleep study but she should follow up with Neurologist for dizziness.  She was advise by neuro to follow up after PT.

## 2022-08-23 ENCOUNTER — LAB ENCOUNTER (OUTPATIENT)
Dept: LAB | Facility: HOSPITAL | Age: 67
End: 2022-08-23
Attending: INTERNAL MEDICINE
Payer: MEDICARE

## 2022-08-23 ENCOUNTER — TELEPHONE (OUTPATIENT)
Dept: RHEUMATOLOGY | Facility: CLINIC | Age: 67
End: 2022-08-23

## 2022-08-23 ENCOUNTER — OFFICE VISIT (OUTPATIENT)
Dept: RHEUMATOLOGY | Facility: CLINIC | Age: 67
End: 2022-08-23
Payer: MEDICARE

## 2022-08-23 VITALS
WEIGHT: 176 LBS | SYSTOLIC BLOOD PRESSURE: 119 MMHG | DIASTOLIC BLOOD PRESSURE: 83 MMHG | HEART RATE: 81 BPM | BODY MASS INDEX: 30.05 KG/M2 | HEIGHT: 64 IN

## 2022-08-23 DIAGNOSIS — M06.00 SERONEGATIVE RHEUMATOID ARTHRITIS (HCC): ICD-10-CM

## 2022-08-23 DIAGNOSIS — M06.00 SERONEGATIVE RHEUMATOID ARTHRITIS (HCC): Primary | ICD-10-CM

## 2022-08-23 DIAGNOSIS — Z51.81 MEDICATION MONITORING ENCOUNTER: ICD-10-CM

## 2022-08-23 LAB
ALBUMIN SERPL-MCNC: 3.2 G/DL (ref 3.4–5)
ALT SERPL-CCNC: 19 U/L
AST SERPL-CCNC: 9 U/L (ref 15–37)
BASOPHILS # BLD AUTO: 0.03 X10(3) UL (ref 0–0.2)
BASOPHILS NFR BLD AUTO: 0.3 %
CREAT BLD-MCNC: 0.68 MG/DL
CRP SERPL-MCNC: 1.04 MG/DL (ref ?–0.3)
DEPRECATED RDW RBC AUTO: 48.6 FL (ref 35.1–46.3)
EOSINOPHIL # BLD AUTO: 0.04 X10(3) UL (ref 0–0.7)
EOSINOPHIL NFR BLD AUTO: 0.4 %
ERYTHROCYTE [DISTWIDTH] IN BLOOD BY AUTOMATED COUNT: 14.2 % (ref 11–15)
ERYTHROCYTE [SEDIMENTATION RATE] IN BLOOD: 24 MM/HR
GFR SERPLBLD BASED ON 1.73 SQ M-ARVRAT: 96 ML/MIN/1.73M2 (ref 60–?)
HCT VFR BLD AUTO: 40.1 %
HGB BLD-MCNC: 12.6 G/DL
IMM GRANULOCYTES # BLD AUTO: 0.11 X10(3) UL (ref 0–1)
IMM GRANULOCYTES NFR BLD: 1.2 %
LYMPHOCYTES # BLD AUTO: 1.21 X10(3) UL (ref 1–4)
LYMPHOCYTES NFR BLD AUTO: 13.6 %
MCH RBC QN AUTO: 29.4 PG (ref 26–34)
MCHC RBC AUTO-ENTMCNC: 31.4 G/DL (ref 31–37)
MCV RBC AUTO: 93.5 FL
MONOCYTES # BLD AUTO: 0.52 X10(3) UL (ref 0.1–1)
MONOCYTES NFR BLD AUTO: 5.8 %
NEUTROPHILS # BLD AUTO: 6.99 X10 (3) UL (ref 1.5–7.7)
NEUTROPHILS # BLD AUTO: 6.99 X10(3) UL (ref 1.5–7.7)
NEUTROPHILS NFR BLD AUTO: 78.7 %
PLATELET # BLD AUTO: 183 10(3)UL (ref 150–450)
RBC # BLD AUTO: 4.29 X10(6)UL
WBC # BLD AUTO: 8.9 X10(3) UL (ref 4–11)

## 2022-08-23 PROCEDURE — 85652 RBC SED RATE AUTOMATED: CPT

## 2022-08-23 PROCEDURE — 99214 OFFICE O/P EST MOD 30 MIN: CPT | Performed by: INTERNAL MEDICINE

## 2022-08-23 PROCEDURE — 84460 ALANINE AMINO (ALT) (SGPT): CPT

## 2022-08-23 PROCEDURE — 85025 COMPLETE CBC W/AUTO DIFF WBC: CPT

## 2022-08-23 PROCEDURE — 84450 TRANSFERASE (AST) (SGOT): CPT

## 2022-08-23 PROCEDURE — 86140 C-REACTIVE PROTEIN: CPT

## 2022-08-23 PROCEDURE — 36415 COLL VENOUS BLD VENIPUNCTURE: CPT

## 2022-08-23 PROCEDURE — 82040 ASSAY OF SERUM ALBUMIN: CPT

## 2022-08-23 PROCEDURE — 82565 ASSAY OF CREATININE: CPT

## 2022-08-23 RX ORDER — PREDNISONE 2.5 MG
2.5 TABLET ORAL DAILY
Qty: 60 TABLET | Refills: 0 | Status: SHIPPED | OUTPATIENT
Start: 2022-08-23

## 2022-08-23 RX ORDER — HYDROXYCHLOROQUINE SULFATE 200 MG/1
400 TABLET, FILM COATED ORAL DAILY
Qty: 180 TABLET | Refills: 0 | Status: SHIPPED | OUTPATIENT
Start: 2022-08-23

## 2022-08-23 NOTE — PATIENT INSTRUCTIONS
You were seen today for rheumatoid arthritis  Joints are better controlled  Continue methotrexate 10 pills weekly  Folic acid daily  Plaquenil 400 mg daily  Taper the prednisone, Take 2.5 mg daily for 2 weeks then 2.5 mg every other day for 2 weeks then stop   Blood work today  See the eye doctors as you are on Plaquenil  See me in 3 months

## 2022-08-25 ENCOUNTER — MED REC SCAN ONLY (OUTPATIENT)
Dept: INTERNAL MEDICINE CLINIC | Facility: CLINIC | Age: 67
End: 2022-08-25

## 2022-08-30 ENCOUNTER — OFFICE VISIT (OUTPATIENT)
Dept: PULMONOLOGY | Facility: CLINIC | Age: 67
End: 2022-08-30
Payer: MEDICARE

## 2022-08-30 VITALS
SYSTOLIC BLOOD PRESSURE: 117 MMHG | HEART RATE: 80 BPM | WEIGHT: 174 LBS | OXYGEN SATURATION: 98 % | RESPIRATION RATE: 16 BRPM | BODY MASS INDEX: 30 KG/M2 | DIASTOLIC BLOOD PRESSURE: 78 MMHG

## 2022-08-30 DIAGNOSIS — G47.33 OBSTRUCTIVE SLEEP APNEA: Primary | ICD-10-CM

## 2022-08-30 PROCEDURE — 99203 OFFICE O/P NEW LOW 30 MIN: CPT | Performed by: PHYSICIAN ASSISTANT

## 2022-08-30 NOTE — PROGRESS NOTES
DME rx, consult, titration, sleep study, & pt reg facesheet faxed to [facility] @ #[phone]. Confirmation rcvd.

## 2022-09-19 ENCOUNTER — TELEPHONE (OUTPATIENT)
Dept: RHEUMATOLOGY | Facility: CLINIC | Age: 67
End: 2022-09-19

## 2022-09-19 NOTE — TELEPHONE ENCOUNTER
Spoke with pt let her know okay to wait till 10/29/22 to see eye doctor. Pt. Verbalized understanding.

## 2022-09-19 NOTE — TELEPHONE ENCOUNTER
Per patient Dr Romario Keyes wants patient to see an eye doctor and the earliest her eye doctor can see patient is on 10/29/2022 and patient would like to know if that is sufficient enough for doctor to wait. Please advise.

## 2022-09-27 ENCOUNTER — TELEPHONE (OUTPATIENT)
Dept: RHEUMATOLOGY | Facility: CLINIC | Age: 67
End: 2022-09-27

## 2022-09-27 NOTE — TELEPHONE ENCOUNTER
Pt given provider's message below and agreeable with plan. She will hold hydroxychloroquine and have eye doctor fax a copy of their notes to the office.

## 2022-09-27 NOTE — TELEPHONE ENCOUNTER
Patient called to state HYDROCHLOROTHIAZIDE   Is causing eye issues, including some trouble with vision, would like to know if medication should be stopped.

## 2022-09-27 NOTE — TELEPHONE ENCOUNTER
Agree that she needs to see ophthalmology. Plaquenil was started in May 2022. Again the blurry vision could be from her diabetes but would recommend to see ophthalmology.   She can hold Plaquenil for now

## 2022-09-27 NOTE — TELEPHONE ENCOUNTER
Please see message below. Pt reports blurred visions and itching. She stated it started to worsen a couple of months ago. She reports she has an appointment with her eye provider the end of October. Pt advised to hold hydroxychloroquine for now and call eye provider to see if she can be seen sooner for possible plaquenil toxicity. Please advise. Pt reports she is diabetic. She is aware provider is out of office until tomorrow.

## 2022-10-12 ENCOUNTER — TELEPHONE (OUTPATIENT)
Dept: INTERNAL MEDICINE CLINIC | Facility: CLINIC | Age: 67
End: 2022-10-12

## 2022-10-12 NOTE — TELEPHONE ENCOUNTER
Patient want Todd Sandoval to know patient received her cpap machine yesterday on 10-11; it is all set up.

## 2022-10-24 RX ORDER — ATORVASTATIN CALCIUM 20 MG/1
20 TABLET, FILM COATED ORAL DAILY
Qty: 90 TABLET | Refills: 1 | Status: SHIPPED | OUTPATIENT
Start: 2022-10-24

## 2022-10-25 RX ORDER — METFORMIN HYDROCHLORIDE 500 MG/1
500 TABLET, EXTENDED RELEASE ORAL DAILY
Qty: 90 TABLET | Refills: 0 | Status: SHIPPED | OUTPATIENT
Start: 2022-10-25

## 2022-10-25 RX ORDER — HYDROCHLOROTHIAZIDE 12.5 MG/1
12.5 CAPSULE, GELATIN COATED ORAL EVERY MORNING
Qty: 90 CAPSULE | Refills: 0 | Status: SHIPPED | OUTPATIENT
Start: 2022-10-25

## 2022-10-25 NOTE — TELEPHONE ENCOUNTER
Refill passed per [place], [facility] protocol. Requested Prescriptions   Pending Prescriptions Disp Refills    HYDROCHLOROTHIAZIDE 12.5 MG Oral Cap [Pharmacy Med Name: HYDROCHLOROTHIAZIDE 12.5MG CAPSULES] 90 capsule 0     Sig: TAKE 1 CAPSULE(12.5 MG) BY MOUTH DAILY IN THE MORNING        Hypertensive Medications Protocol Failed - 10/24/2022  8:45 PM        Failed - CMP or BMP in past 6 months     No results found for this or any previous visit (from the past 4392 hour(s)).               Failed - In person appointment or virtual visit in the past 6 months       Recent Outpatient Visits              1 month ago Obstructive sleep apnea    [place], [address] [place], [place]    Office Visit    2 months ago Seronegative rheumatoid arthritis [facility])    [facility], [address], Riya Bellamy MD    Office Visit    4 months ago Sleep concern    [address] [facility]    Office Visit    5 months ago Seronegative rheumatoid arthritis [facility])    [facility], [address], Riya Bellamy MD    Office Visit    6 months ago Fatigue, unspecified type    [address] [facility]    Office Visit     Future Appointments         Provider Department Appt Notes    In 3 weeks Aaron Johnson, GH, [address] f/u Cpap     In 2 months Jenaro Gay, 410 [facility], [address], [place] 4 mo f/u               Passed - In person appointment in the past 12 or next 3 months       Recent Outpatient Visits              1 month ago Obstructive sleep apnea    [place] [address] [place], [place]    Office Visit    2 months ago Seronegative rheumatoid arthritis [facility])    [facility] Lu MD Raad    Office Visit    4 months ago Sleep concern    [facility]    Office Visit    5 months ago Seronegative rheumatoid arthritis [facility])    [facility], [address], Ursula Bellamy MD    Office Visit    6 months ago Fatigue, unspecified type    [address] [facility]    Office Visit     Future Appointments         Provider Department Appt Notes    In 3 weeks Frieda Cordon, [facility], [address] f/u Cpap     In 2 months Abibrandan Fulton, 410 [facility], [address], [place] 4 mo f/u               Passed - Last BP reading less than 140/90     BP Readings from Last 1 Encounters:  08/30/22 : 117/78                Passed - EGFRCR or GFRAA > 50     GFR Evaluation  EGFRCR: 96 , resulted on 8/23/2022               ATORVASTATIN 20 MG Oral Tab [Pharmacy Med Name: ATORVASTATIN 20MG TABLETS] 90 tablet 0     Sig: TAKE 1 TABLET(20 MG) BY MOUTH DAILY        Cholesterol Medication Protocol Passed - 10/24/2022  8:45 PM        Passed - ALT in past 12 months        Passed - LDL in past 12 months        Passed - Last ALT < 80       Lab Results   Component Value Date    ALT 19 08/23/2022             Passed - Last LDL < 130     Lab Results   Component Value Date    LDL 85 02/07/2022               Passed - In person appointment or virtual visit in the past 12 mos or appointment in next 3 mos       Recent Outpatient Visits              1 month ago Obstructive sleep apnea    [place], [address] [facility], Hipolito Bliss Saratha Kearns    Office Visit    2 months ago Seronegative rheumatoid arthritis [facility])    [facility], [address], Ursula Bellamy MD    Office Visit    4 months ago Sleep concern    [address] [facility]    Office Visit    5 months ago Seronegative rheumatoid arthritis [facility])    [facility] Max Velasquez [facility]    Office Visit    6 months ago Fatigue, unspecified type    [facility]    Office Visit     Future Appointments         Provider Department Appt Notes    In 3 weeks [place] Judah, GH, [address] f/u Cpap     In 2 months Ioana Quinn, 410 [facility], [address], [place] 4 mo f/u                  METFORMIN  MG Oral Tablet 24 Hr [Pharmacy Med Name: METFORMIN ER 500MG 24HR TABS] 90 tablet 0     Sig: TAKE 1 TABLET(500 MG) BY MOUTH DAILY        Diabetes Medication Protocol Failed - 10/24/2022  8:45 PM        Failed - Last A1C < 7.5 and within past 6 months     Lab Results   Component Value Date    A1C 6.3 (H) 02/07/2022               Passed - In person appointment or virtual visit in the past 6 mos or appointment in next 3 mos       Recent Outpatient Visits              1 month ago Obstructive sleep apnea    Loli, 2 [place], [place]    Office Visit    2 months ago Seronegative rheumatoid arthritis [facility])    [facility], [address], Naz Bellamy MD    Office Visit    4 months ago Sleep concern    [address] [facility]    Office Visit    5 months ago Seronegative rheumatoid arthritis [facility])    [facility], [address], Naz Bellamy MD    Office Visit    6 months ago Fatigue, unspecified type    [address] [facility]    Office Visit     Future Appointments         Provider Department Appt Notes    In 3 weeks [place] Judah, GH, [address] f/u Cpap     In 2 months Ioana Quinn, [address] [facility], [address], [place] 4 mo f/u               Passed - EGFRCR or GFRAA > 50     GFR Evaluation  EGFRCR: 96 , resulted on 8/23/2022            Passed - GFR in the past 12 months               Future Appointments         Provider Department Appt Notes    In 3 weeks [place] Judah, GH, [address] f/u Cpap     In 2 months Rahuljude Slice, 410 [facility], [address] [facility] 4 mo f/u             Recent Outpatient Visits              1 month ago Obstructive sleep apnea    Loli, 602 [facility], Ruthy Remniku, [employer]    Office Visit    2 months ago Seronegative rheumatoid arthritis [facility])    [facility], [address], Slava Bellamy MD    Office Visit    4 months ago Sleep concern    [facility]    Office Visit    5 months ago Seronegative rheumatoid arthritis [facility])    [facility], [address], Slava Bellamy MD    Office Visit    6 months ago Fatigue, unspecified type    [facility]    Office Visit

## 2022-10-25 NOTE — TELEPHONE ENCOUNTER
Dr Hassan Seip  (on behalf of Demetrius Cao )       Future Appointments   Date Time Provider Alfonzo Novoa   11/14/2022  1:30 PM [place] [id number] [place] [address]   12/23/2022 12:00 PM Nathalia Simon MD 2014 [facility] OF THE [place]           Last office visit 3/23/2022  Return in 3 months (on 6/23/2022). Please review; protocol failed/no protocol. Requested Prescriptions   Pending Prescriptions Disp Refills    HYDROCHLOROTHIAZIDE 12.5 MG Oral Cap [Pharmacy Med Name: HYDROCHLOROTHIAZIDE 12.5MG CAPSULES] 90 capsule 0     Sig: TAKE 1 CAPSULE(12.5 MG) BY MOUTH DAILY IN THE MORNING        Hypertensive Medications Protocol Failed - 10/24/2022  8:45 PM        Failed - CMP or BMP in past 6 months     No results found for this or any previous visit (from the past 4392 hour(s)).               Failed - In person appointment or virtual visit in the past 6 months       Recent Outpatient Visits              1 month ago Obstructive sleep apnea    [place], 602 [facility]    Office Visit    2 months ago Seronegative rheumatoid arthritis [facility])    [facility], [address], Bev Bellamy MD    Office Visit    4 months ago Sleep concern    [address] [facility]    Office Visit    5 months ago Seronegative rheumatoid arthritis [facility])    [facility], [address], Bev Bellamy MD    Office Visit    6 months ago Fatigue, unspecified type    [address] [facility]    Office Visit     Future Appointments         Provider Department Appt Notes    In 3 weeks Maple Pill, GH, [address] f/u Cpap     In 2 months Nathalia Simon, [address] [facility], [address] 4 mo f/u               Passed - In person appointment in the past 12 or next 3 months       Recent Outpatient Visits              1 month ago Obstructive sleep apnea    Helen Melissa Beth [address], [address] [place] [address] [place], [employer]    Office Visit    2 months ago Seronegative rheumatoid arthritis [facility])    [facility], [place], Viola Bellamy MD    Office Visit    4 months ago Sleep concern    [address] [facility]    Office Visit    5 months ago Seronegative rheumatoid arthritis [facility])    [facility], [place], Viola Bellamy MD    Office Visit    6 months ago Fatigue, unspecified type    [address] [facility]    Office Visit     Future Appointments         Provider Department Appt Notes    In 3 weeks Junior Estes, , [address] f/u Cpap     In 2 months Hever Morales, [address] [place], [place], [place] 4 mo f/u               Passed - Last BP reading less than 140/90     BP Readings from Last 1 Encounters:  08/30/22 : 117/78                Passed - EGFRCR or GFRAA > 50     GFR Evaluation  EGFRCR: 96 , resulted on 8/23/2022               METFORMIN  MG Oral Tablet 24 Hr [Pharmacy Med Name: METFORMIN ER 500MG 24HR TABS] 90 tablet 0     Sig: TAKE 1 TABLET(500 MG) BY MOUTH DAILY        Diabetes Medication Protocol Failed - 10/24/2022  8:45 PM        Failed - Last A1C < 7.5 and within past 6 months     Lab Results   Component Value Date    A1C 6.3 (H) 02/07/2022               Passed - In person appointment or virtual visit in the past 6 mos or appointment in next 3 mos       Recent Outpatient Visits              1 month ago Obstructive sleep apnea    Loli, 2 [facility], [place] [employer] [employer]    Office Visit    2 months ago Seronegative rheumatoid arthritis [facility])    [facility], [place], Viola Bellamy MD    Office Visit    4 months ago Sleep concern    [address] [facility]    Office Visit    5 months ago Seronegative rheumatoid arthritis [facility])    [facility], [address], Sharon Bellamy MD    Office Visit    6 months ago Fatigue, unspecified type    [facility]    Office Visit     Future Appointments         Provider Department Appt Notes    In 3 weeks Consuelo Ochoa, RONNA, [address] f/u Cpap     In 2 months Rizwana Kaur, 410 [facility], [address], [place] 4 mo f/u               Passed - [facility] or GFRAA > 50     GFR Evaluation  EGFRCR: 96 , resulted on 8/23/2022            Passed - GFR in the past 12 months          Signed Prescriptions Disp Refills    atorvastatin 20 MG Oral Tab 90 tablet 1     Sig: Take 1 tablet (20 mg total) by mouth daily.         Cholesterol Medication Protocol Passed - 10/24/2022  8:45 PM        Passed - ALT in past 12 months        Passed - LDL in past 12 months        Passed - Last ALT < 80       Lab Results   Component Value Date    ALT 19 08/23/2022             Passed - Last LDL < 130     Lab Results   Component Value Date    LDL 85 02/07/2022               Passed - In person appointment or virtual visit in the past 12 mos or appointment in next 3 mos       Recent Outpatient Visits              1 month ago Obstructive sleep apnea    Loli, 602 [facility] [place], [place], [employer]    Office Visit    2 months ago Seronegative rheumatoid arthritis [facility])    [facility], [address], Sharon Bellamy MD    Office Visit    4 months ago Sleep concern    [facility]    Office Visit    5 months ago Seronegative rheumatoid arthritis [facility])    [facility], [address], Sharon Bellamy MD    Office Visit    6 months ago Fatigue, unspecified type    [facility]    Office Visit     Future Appointments         Provider Department Appt Notes    In 3 weeks Consuelo Ochoa, [facility] [place], [facility], [address] f/u Cpap     In 2 months Lizette Proctor, [facility], [address] 4 mo f/u                      Recent Outpatient Visits              1 month ago Obstructive sleep apnea    [place]oli, [facility] [place], [place], [place], [employer]    Office Visit    2 months ago Seronegative rheumatoid arthritis [facility])    [facility], [address], Keesha Bellamy MD    Office Visit    4 months ago Sleep concern    [facility]    Office Visit    5 months ago Seronegative rheumatoid arthritis [facility])    [facility], [address], Keesha Bellamy MD    Office Visit    6 months ago Fatigue, unspecified type    [facility]    Office Visit             Future Appointments         Provider Department Appt Notes    In 3 weeks RONNA Rollins, [address] [employer] f/u Cpap     In 2 months Lizette Proctor, [facility], [address], [place] 4 mo f/u

## 2022-11-02 ENCOUNTER — MED REC SCAN ONLY (OUTPATIENT)
Dept: INTERNAL MEDICINE CLINIC | Facility: CLINIC | Age: 67
End: 2022-11-02

## 2022-11-02 PROBLEM — H25.11 SENILE NUCLEAR CATARACT, RIGHT: Status: ACTIVE | Noted: 2022-11-02

## 2022-11-08 ENCOUNTER — TELEPHONE (OUTPATIENT)
Dept: RHEUMATOLOGY | Facility: CLINIC | Age: 67
End: 2022-11-08

## 2022-11-08 NOTE — TELEPHONE ENCOUNTER
She was seen by ophthalmology 10/28/2022, no evidence of Plaquenil toxicity.   They did mention that she was off Plaquenil

## 2022-11-16 ENCOUNTER — OFFICE VISIT (OUTPATIENT)
Dept: INTERNAL MEDICINE CLINIC | Facility: CLINIC | Age: 67
End: 2022-11-16
Payer: MEDICARE

## 2022-11-16 VITALS
BODY MASS INDEX: 30.16 KG/M2 | SYSTOLIC BLOOD PRESSURE: 120 MMHG | HEART RATE: 70 BPM | HEIGHT: 64 IN | WEIGHT: 176.63 LBS | DIASTOLIC BLOOD PRESSURE: 70 MMHG | OXYGEN SATURATION: 99 %

## 2022-11-16 DIAGNOSIS — E55.9 VITAMIN D DEFICIENCY: ICD-10-CM

## 2022-11-16 DIAGNOSIS — D51.0 PERNICIOUS ANEMIA: ICD-10-CM

## 2022-11-16 DIAGNOSIS — D64.9 ANEMIA, UNSPECIFIED TYPE: ICD-10-CM

## 2022-11-16 DIAGNOSIS — I10 ESSENTIAL HYPERTENSION: ICD-10-CM

## 2022-11-16 DIAGNOSIS — E53.8 VITAMIN B12 DEFICIENCY: ICD-10-CM

## 2022-11-16 DIAGNOSIS — R42 DIZZY: Primary | ICD-10-CM

## 2022-11-16 DIAGNOSIS — E11.9 TYPE 2 DIABETES MELLITUS WITHOUT COMPLICATION, WITHOUT LONG-TERM CURRENT USE OF INSULIN (HCC): ICD-10-CM

## 2022-11-16 DIAGNOSIS — M06.00 SERONEGATIVE RHEUMATOID ARTHRITIS (HCC): ICD-10-CM

## 2022-11-16 DIAGNOSIS — F32.A DEPRESSION, UNSPECIFIED DEPRESSION TYPE: ICD-10-CM

## 2022-11-16 LAB
BASOPHILS # BLD AUTO: 0.02 X10(3) UL (ref 0–0.2)
BASOPHILS NFR BLD AUTO: 0.3 %
DEPRECATED RDW RBC AUTO: 48.5 FL (ref 35.1–46.3)
EOSINOPHIL # BLD AUTO: 0.03 X10(3) UL (ref 0–0.7)
EOSINOPHIL NFR BLD AUTO: 0.5 %
ERYTHROCYTE [DISTWIDTH] IN BLOOD BY AUTOMATED COUNT: 13.8 % (ref 11–15)
HCT VFR BLD AUTO: 40.8 %
HGB BLD-MCNC: 12.4 G/DL
IMM GRANULOCYTES # BLD AUTO: 0.04 X10(3) UL (ref 0–1)
IMM GRANULOCYTES NFR BLD: 0.6 %
LYMPHOCYTES # BLD AUTO: 0.96 X10(3) UL (ref 1–4)
LYMPHOCYTES NFR BLD AUTO: 15.1 %
MCH RBC QN AUTO: 29 PG (ref 26–34)
MCHC RBC AUTO-ENTMCNC: 30.4 G/DL (ref 31–37)
MCV RBC AUTO: 95.3 FL
MONOCYTES # BLD AUTO: 0.4 X10(3) UL (ref 0.1–1)
MONOCYTES NFR BLD AUTO: 6.3 %
NEUTROPHILS # BLD AUTO: 4.89 X10 (3) UL (ref 1.5–7.7)
NEUTROPHILS # BLD AUTO: 4.89 X10(3) UL (ref 1.5–7.7)
NEUTROPHILS NFR BLD AUTO: 77.2 %
PLATELET # BLD AUTO: 229 10(3)UL (ref 150–450)
RBC # BLD AUTO: 4.28 X10(6)UL
WBC # BLD AUTO: 6.3 X10(3) UL (ref 4–11)

## 2022-11-16 PROCEDURE — 99214 OFFICE O/P EST MOD 30 MIN: CPT | Performed by: NURSE PRACTITIONER

## 2022-11-16 PROCEDURE — 36415 COLL VENOUS BLD VENIPUNCTURE: CPT | Performed by: NURSE PRACTITIONER

## 2022-11-16 NOTE — PATIENT INSTRUCTIONS
ASSESSMENT/PLAN:   Dizzy  (primary encounter diagnosis)  Check blood  Ordered ultrasound -call to schedule    Essential hypertension  Careful with diet and excercise at least 30 minutes 3-4 times a week. Check blood pressures at different times on different days. Can purchase own blood pressure monitor. If not, check at local pharmacy. Bake foods more and grill occasionally. Avoid fried foods. No salt. Use other seasonings. Type 2 diabetes mellitus without complication, without long-term current use of insulin ([facility])  Careful with diet and excercise at least 30 minutes 3-4 times a week. Check sugars at different times on different dates. Careful with low sugars. Carry something with you and check sugar if can. Can carry alexandra cracker, etc. Decrease carbohydrates. But also, careful with fruits and natural sugars. One serving a day and no more than 1 handful every day. Check feet  every AM and careful with sores and ulcers on feet bilaterally. Check eyes every year with dilated eye exam.  Check sugars. 2-hour postmeal should be less than 140s. Pre-meal should be 's. Both equally affected A1c. Discussed importance of glycemic control to prevent complications of diabetes  -Discussed complications of diabetes include retinopathy, neuropathy, nephropathy and cardiovascular disease  -Discussed ABCs of DM  -Discussed importance of SBGM  -Discussed importance of low CHO diet, recommend 45gm per meal or 135gm per day  -Normal foot exam  -UTD with optho  -Normotensive    Vitamin d deficiency  Check blood    Pernicious anemia  Check blood    Seronegative rheumatoid arthritis (hcc)  Follow up with rheumatologist    Anemia, unspecified type  Check blood    Vitamin b12 deficiency  Check blood    Depression  Refused therapy at this time.       Orders Placed This Encounter      Vitamin D      Vitamin B12      Comp Metabolic Panel (14)      Iron And Tibc      Hemoglobin A1C      Lipid Panel      TSH W Reflex To Free T4      CBC With Differential With Platelet    Follow up in 3 months or sooner if needed.   Meds This Visit:  Requested Prescriptions      No prescriptions requested or ordered in this encounter       Imaging & Referrals:  NEURO - INTERNAL  US CAROTID DOPPLER VENUS LUU (CPT=[id number])

## 2022-11-17 LAB
ALBUMIN SERPL-MCNC: 3.6 G/DL (ref 3.4–5)
ALBUMIN/GLOB SERPL: 1 {RATIO} (ref 1–2)
ALP LIVER SERPL-CCNC: 59 U/L
ALT SERPL-CCNC: 17 U/L
ANION GAP SERPL CALC-SCNC: 5 MMOL/L (ref 0–18)
AST SERPL-CCNC: 13 U/L (ref 15–37)
BILIRUB SERPL-MCNC: 0.6 MG/DL (ref 0.1–2)
BUN BLD-MCNC: 11 MG/DL (ref 7–18)
BUN/CREAT SERPL: 21.2 (ref 10–20)
CALCIUM BLD-MCNC: 9.5 MG/DL (ref 8.5–10.1)
CHLORIDE SERPL-SCNC: 102 MMOL/L (ref 98–112)
CHOLEST SERPL-MCNC: 171 MG/DL (ref ?–200)
CO2 SERPL-SCNC: 30 MMOL/L (ref 21–32)
CREAT BLD-MCNC: 0.52 MG/DL
EST. AVERAGE GLUCOSE BLD GHB EST-MCNC: 123 MG/DL (ref 68–126)
FASTING PATIENT LIPID ANSWER: NO
FASTING STATUS PATIENT QL REPORTED: NO
GFR SERPLBLD BASED ON 1.73 SQ M-ARVRAT: 102 ML/MIN/1.73M2 (ref 60–?)
GLOBULIN PLAS-MCNC: 3.7 G/DL (ref 2.8–4.4)
GLUCOSE BLD-MCNC: 62 MG/DL (ref 70–99)
HBA1C MFR BLD: 5.9 % (ref ?–5.7)
HDLC SERPL-MCNC: 50 MG/DL (ref 40–59)
IRON SATN MFR SERPL: 16 %
IRON SERPL-MCNC: 54 UG/DL
LDLC SERPL CALC-MCNC: 103 MG/DL (ref ?–100)
NONHDLC SERPL-MCNC: 121 MG/DL (ref ?–130)
OSMOLALITY SERPL CALC.SUM OF ELEC: 281 MOSM/KG (ref 275–295)
POTASSIUM SERPL-SCNC: 3.2 MMOL/L (ref 3.5–5.1)
PROT SERPL-MCNC: 7.3 G/DL (ref 6.4–8.2)
SODIUM SERPL-SCNC: 137 MMOL/L (ref 136–145)
TIBC SERPL-MCNC: 338 UG/DL (ref 240–450)
TRANSFERRIN SERPL-MCNC: 227 MG/DL (ref 200–360)
TRIGL SERPL-MCNC: 100 MG/DL (ref 30–149)
TSI SER-ACNC: 0.87 MIU/ML (ref 0.36–3.74)
VIT B12 SERPL-MCNC: 702 PG/ML (ref 193–986)
VIT D+METAB SERPL-MCNC: 21.4 NG/ML (ref 30–100)
VLDLC SERPL CALC-MCNC: 17 MG/DL (ref 0–30)

## 2022-11-25 ENCOUNTER — HOSPITAL ENCOUNTER (OUTPATIENT)
Dept: ULTRASOUND IMAGING | Facility: HOSPITAL | Age: 67
Discharge: HOME | End: 2022-11-25
Attending: NURSE PRACTITIONER
Payer: MEDICARE

## 2022-11-25 DIAGNOSIS — R42 DIZZY: ICD-10-CM

## 2022-11-25 PROCEDURE — 93880 EXTRACRANIAL BILAT STUDY: CPT | Performed by: NURSE PRACTITIONER

## 2022-11-26 PROBLEM — E04.1 THYROID NODULE: Status: ACTIVE | Noted: 2022-11-26

## 2022-11-28 ENCOUNTER — TELEPHONE (OUTPATIENT)
Dept: INTERNAL MEDICINE CLINIC | Facility: CLINIC | Age: 67
End: 2022-11-28

## 2022-11-28 DIAGNOSIS — E78.89 LIPIDS ABNORMAL: Primary | ICD-10-CM

## 2022-12-22 ENCOUNTER — LAB ENCOUNTER (OUTPATIENT)
Dept: LAB | Facility: HOSPITAL | Age: 67
End: 2022-12-22
Attending: NURSE PRACTITIONER
Payer: MEDICARE

## 2022-12-22 ENCOUNTER — HOSPITAL ENCOUNTER (OUTPATIENT)
Dept: ULTRASOUND IMAGING | Facility: HOSPITAL | Age: 67
Discharge: HOME | End: 2022-12-22
Attending: NURSE PRACTITIONER
Payer: MEDICARE

## 2022-12-22 DIAGNOSIS — E04.1 THYROID NODULE: ICD-10-CM

## 2022-12-22 DIAGNOSIS — Z20.822 ENCOUNTER FOR PREPROCEDURE SCREENING LABORATORY TESTING FOR COVID-19: ICD-10-CM

## 2022-12-22 DIAGNOSIS — E87.6 LOW BLOOD POTASSIUM: ICD-10-CM

## 2022-12-22 DIAGNOSIS — E04.1 THYROID NODULE: Primary | ICD-10-CM

## 2022-12-22 DIAGNOSIS — Z01.812 ENCOUNTER FOR PREPROCEDURE SCREENING LABORATORY TESTING FOR COVID-19: ICD-10-CM

## 2022-12-22 LAB
ANION GAP SERPL CALC-SCNC: 5 MMOL/L (ref 0–18)
BUN BLD-MCNC: 8 MG/DL (ref 7–18)
BUN/CREAT SERPL: 14.8 (ref 10–20)
CALCIUM BLD-MCNC: 9.2 MG/DL (ref 8.5–10.1)
CHLORIDE SERPL-SCNC: 106 MMOL/L (ref 98–112)
CO2 SERPL-SCNC: 29 MMOL/L (ref 21–32)
CREAT BLD-MCNC: 0.54 MG/DL
FASTING STATUS PATIENT QL REPORTED: NO
GFR SERPLBLD BASED ON 1.73 SQ M-ARVRAT: 101 ML/MIN/1.73M2 (ref 60–?)
GLUCOSE BLD-MCNC: 94 MG/DL (ref 70–99)
OSMOLALITY SERPL CALC.SUM OF ELEC: 288 MOSM/KG (ref 275–295)
POTASSIUM SERPL-SCNC: 3.2 MMOL/L (ref 3.5–5.1)
SODIUM SERPL-SCNC: 140 MMOL/L (ref 136–145)

## 2022-12-22 PROCEDURE — 36415 COLL VENOUS BLD VENIPUNCTURE: CPT

## 2022-12-22 PROCEDURE — 76536 US EXAM OF HEAD AND NECK: CPT | Performed by: NURSE PRACTITIONER

## 2022-12-22 PROCEDURE — 80048 BASIC METABOLIC PNL TOTAL CA: CPT

## 2022-12-27 ENCOUNTER — OFFICE VISIT (OUTPATIENT)
Dept: RHEUMATOLOGY | Facility: CLINIC | Age: 67
End: 2022-12-27
Payer: MEDICARE

## 2022-12-27 VITALS
BODY MASS INDEX: 29.88 KG/M2 | HEART RATE: 91 BPM | DIASTOLIC BLOOD PRESSURE: 82 MMHG | SYSTOLIC BLOOD PRESSURE: 130 MMHG | HEIGHT: 64 IN | WEIGHT: 175 LBS

## 2022-12-27 DIAGNOSIS — M06.00 SERONEGATIVE RHEUMATOID ARTHRITIS (HCC): ICD-10-CM

## 2022-12-27 DIAGNOSIS — Z51.81 MEDICATION MONITORING ENCOUNTER: ICD-10-CM

## 2022-12-27 PROCEDURE — 99214 OFFICE O/P EST MOD 30 MIN: CPT | Performed by: INTERNAL MEDICINE

## 2022-12-27 RX ORDER — FOLIC ACID 1 MG/1
1 TABLET ORAL DAILY
Qty: 90 TABLET | Refills: 2 | Status: SHIPPED | OUTPATIENT
Start: 2022-12-27

## 2022-12-27 RX ORDER — METHOTREXATE 2.5 MG/1
25 TABLET ORAL
Qty: 130 TABLET | Refills: 1 | Status: SHIPPED | OUTPATIENT
Start: 2022-12-27

## 2022-12-27 NOTE — PATIENT INSTRUCTIONS
You were seen today for rheumatoid arthritis  Continue methotrexate 10 pills weekly and folic acid every day  Continue to hold the Plaquenil due to the blurry vision  You are now off prednisone  Blood work in February  See me in February  If you continue to have joint pain we may have to discuss adding another medication to the methotrexate

## 2023-01-06 ENCOUNTER — TELEPHONE (OUTPATIENT)
Dept: INTERNAL MEDICINE CLINIC | Facility: CLINIC | Age: 68
End: 2023-01-06

## 2023-01-30 ENCOUNTER — TELEPHONE (OUTPATIENT)
Dept: INTERNAL MEDICINE CLINIC | Facility: CLINIC | Age: 68
End: 2023-01-30

## 2023-01-30 NOTE — TELEPHONE ENCOUNTER
[address] [facility] - Neurology cancelled patient's appt this Thursday. She was waiting for 2 months per patient. Patient in contact with neurology to re-schedule. Patient called office. Patient's date of birth and full name both confirmed. She is asking for the name of the Neurologist, with whom she had an appointment for 2/2/23. Because Neurology office is giving her a hard time about this appointment. RN provided name of Neurologist: Dr. Driss Rivera MD.   Per patient, she waited 2 months for this 2/2/23 Neurology appt. And said that no one from Neurology Office spoke with her directly to cancel that appointment and/or offer another appointment. And now she has to wait another 2 months. Per appointment desk:   2/2/23 Appointment was cancelled for reason:     Cancel Rsn: Provider - [facility] and reschedule)     Patient will call neurology office to discuss further, now that she has the name of Dr. Caridad Cabral. Patient asking when to follow-up with [facility]. RN reviewed patient's schedule and advised that 4/24/23 appointment is set up. Patient then says that no one spoke with her to schedule this appointment. Chart reviewed - appointment desk shows this was made on Made On: 1/23/2023 1:20 PM   By: Pauleen Goldberg [[id number]] (ES)    Future Appointments   Date Time Provider Alfonzo Novoa   2/27/2023 12:00 PM Jayme Epps MD [facility]  [facility]   4/24/2023  1:30 PM LAST [id number] [address]     RN offered to cancel April appointment and reschedule for a times that works for patient. But patient says she will keep that appointment date, and is aware of the location as [place] [address] (She normally sees [facility] at [facility]). RN entered incident reports for both appointments mentioned.

## 2023-02-02 ENCOUNTER — OFFICE VISIT (OUTPATIENT)
Dept: NEUROLOGY | Facility: CLINIC | Age: 68
End: 2023-02-02
Payer: MEDICARE

## 2023-02-02 VITALS
BODY MASS INDEX: 29.71 KG/M2 | HEART RATE: 70 BPM | DIASTOLIC BLOOD PRESSURE: 84 MMHG | SYSTOLIC BLOOD PRESSURE: 116 MMHG | HEIGHT: 64 IN | WEIGHT: 174 LBS

## 2023-02-02 DIAGNOSIS — R42 VERTIGO: Primary | ICD-10-CM

## 2023-02-02 PROCEDURE — 99213 OFFICE O/P EST LOW 20 MIN: CPT | Performed by: OTHER

## 2023-02-03 ENCOUNTER — TELEPHONE (OUTPATIENT)
Dept: PULMONOLOGY | Facility: CLINIC | Age: 68
End: 2023-02-03

## 2023-02-03 NOTE — TELEPHONE ENCOUNTER
Received fax from [facility] requesting face to face office visit notes from 11/11/22 for CPAP compliance. Patient's LOV was 8/30/22. Placed an outgoing call to patient to schedule an appointment. Patient stated that she does not wish to make a follow up appointment because she believes her CPAP is not helping. She requested that the company can  the device as she is not using it at all. Faxed back to [employer] detailing the above information. Received confirmation.

## 2023-02-09 ENCOUNTER — MED REC SCAN ONLY (OUTPATIENT)
Dept: INTERNAL MEDICINE CLINIC | Facility: CLINIC | Age: 68
End: 2023-02-09

## 2023-02-09 PROBLEM — H43.391 FLOATER, VITREOUS, RIGHT: Status: ACTIVE | Noted: 2023-02-09

## 2023-02-15 ENCOUNTER — TELEPHONE (OUTPATIENT)
Dept: RHEUMATOLOGY | Facility: CLINIC | Age: 68
End: 2023-02-15

## 2023-02-15 NOTE — TELEPHONE ENCOUNTER
Patient was seen by ophthalmology on 2/6/2023, [facility], no evidence of Plaquenil toxicity

## 2023-02-27 ENCOUNTER — LAB ENCOUNTER (OUTPATIENT)
Dept: LAB | Facility: HOSPITAL | Age: 68
End: 2023-02-27
Attending: INTERNAL MEDICINE
Payer: MEDICARE

## 2023-02-27 ENCOUNTER — OFFICE VISIT (OUTPATIENT)
Dept: RHEUMATOLOGY | Facility: CLINIC | Age: 68
End: 2023-02-27

## 2023-02-27 VITALS
BODY MASS INDEX: 28.17 KG/M2 | HEART RATE: 86 BPM | SYSTOLIC BLOOD PRESSURE: 127 MMHG | WEIGHT: 165 LBS | DIASTOLIC BLOOD PRESSURE: 82 MMHG | HEIGHT: 64 IN

## 2023-02-27 DIAGNOSIS — M06.00 SERONEGATIVE RHEUMATOID ARTHRITIS (HCC): Primary | ICD-10-CM

## 2023-02-27 DIAGNOSIS — R76.8 HEPATITIS B CORE ANTIBODY POSITIVE: ICD-10-CM

## 2023-02-27 DIAGNOSIS — M06.00 SERONEGATIVE RHEUMATOID ARTHRITIS (HCC): ICD-10-CM

## 2023-02-27 DIAGNOSIS — Z51.81 MEDICATION MONITORING ENCOUNTER: ICD-10-CM

## 2023-02-27 LAB
HBV CORE AB SERPL QL IA: REACTIVE
HBV SURFACE AB SER QL: REACTIVE
HBV SURFACE AB SERPL IA-ACNC: 155.83 MIU/ML
HBV SURFACE AG SER-ACNC: <0.1 [IU]/L
HBV SURFACE AG SERPL QL IA: NONREACTIVE
HCV AB SERPL QL IA: NONREACTIVE

## 2023-02-27 PROCEDURE — 86480 TB TEST CELL IMMUN MEASURE: CPT

## 2023-02-27 PROCEDURE — 86803 HEPATITIS C AB TEST: CPT | Performed by: INTERNAL MEDICINE

## 2023-02-27 PROCEDURE — 86704 HEP B CORE ANTIBODY TOTAL: CPT | Performed by: INTERNAL MEDICINE

## 2023-02-27 PROCEDURE — 86706 HEP B SURFACE ANTIBODY: CPT | Performed by: INTERNAL MEDICINE

## 2023-02-27 PROCEDURE — 87340 HEPATITIS B SURFACE AG IA: CPT | Performed by: INTERNAL MEDICINE

## 2023-02-27 PROCEDURE — 99215 OFFICE O/P EST HI 40 MIN: CPT | Performed by: INTERNAL MEDICINE

## 2023-02-27 PROCEDURE — 36415 COLL VENOUS BLD VENIPUNCTURE: CPT

## 2023-02-27 PROCEDURE — 1125F AMNT PAIN NOTED PAIN PRSNT: CPT | Performed by: INTERNAL MEDICINE

## 2023-02-27 RX ORDER — PREDNISONE 1 MG/1
5 TABLET ORAL DAILY
Qty: 30 TABLET | Refills: 1 | Status: SHIPPED | OUTPATIENT
Start: 2023-02-27

## 2023-02-27 RX ORDER — METHOTREXATE 2.5 MG/1
25 TABLET ORAL
Qty: 130 TABLET | Refills: 1 | Status: SHIPPED | OUTPATIENT
Start: 2023-02-27

## 2023-02-27 RX ORDER — FOLIC ACID 1 MG/1
1 TABLET ORAL DAILY
Qty: 90 TABLET | Refills: 2 | Status: SHIPPED | OUTPATIENT
Start: 2023-02-27

## 2023-02-27 NOTE — PATIENT INSTRUCTIONS
You were seen today for rheumatoid arthritis  You continue to have a lot of pain and stiffness in your joints  For now continue methotrexate 10 pills every 7 days  Folic acid 1 pill a day  Start prednisone 5 mg daily  Plan to get you approved for Humira  Blood work today  Can follow-up in 3-month

## 2023-02-28 ENCOUNTER — TELEPHONE (OUTPATIENT)
Dept: RHEUMATOLOGY | Facility: CLINIC | Age: 68
End: 2023-02-28

## 2023-02-28 NOTE — TELEPHONE ENCOUNTER
QFT pending    Prior authorization for: Humira    Medication form: 40 mg pen    Date received: 2/28/23    Approval #:    Approved dates: 1/29/2023 to 2/28/2024    Pharmacy for medication:

## 2023-02-28 NOTE — TELEPHONE ENCOUNTER
Screening labs in process, QFT    Prior authorization for: Humira    Medication form: 40 mg pen    Date submitted: 2/28/23    Submission method:  MobileAware    Tracking #:

## 2023-03-01 LAB
M TB IFN-G CD4+ T-CELLS BLD-ACNC: 0.05 IU/ML
M TB TUBERC IFN-G BLD QL: NEGATIVE
M TB TUBERC IGNF/MITOGEN IGNF CONTROL: 7.91 IU/ML
QFT TB1 AG MINUS NIL: 0 IU/ML
QFT TB2 AG MINUS NIL: 0 IU/ML

## 2023-03-01 RX ORDER — ADALIMUMAB 40MG/0.4ML
40 KIT SUBCUTANEOUS
Qty: 2 EACH | Refills: 5 | Status: SHIPPED | OUTPATIENT
Start: 2023-03-01 | End: 2023-03-09

## 2023-03-01 NOTE — TELEPHONE ENCOUNTER
Component      Latest Ref Rng & Units 2/27/2023   Quantiferon TB NIL      IU/mL 0.05   Quantiferon-TB1 Minus NIL      IU/mL 0.00   Quantiferon-TB2 Minus NIL      IU/mL 0.00   Quantiferon TB Mitogen minus NIL      IU/mL 7.91   QTB. RESULT      Negative Negative       Humira pended.    Will schedule teaching with provider script sign off and approval .

## 2023-03-07 ENCOUNTER — TELEPHONE (OUTPATIENT)
Dept: RHEUMATOLOGY | Facility: CLINIC | Age: 68
End: 2023-03-07

## 2023-03-07 NOTE — TELEPHONE ENCOUNTER
Patient is calling and states that she is waiting for a phone call for someone to get someone to go over her test results with her. Can you please call her and go over the results.

## 2023-03-07 NOTE — TELEPHONE ENCOUNTER
Patient is returning phone call and states that she is not understanding why her medication is being sent through mail order and made it clear to me that she is not excepting medication that is going to be mailed to her.

## 2023-03-08 NOTE — TELEPHONE ENCOUNTER
Her conditions do not give her ability to get out of the house for injections. She cannot drive all the time and she does not have someone that can drive her to appointments.

## 2023-03-08 NOTE — TELEPHONE ENCOUNTER
Juancarlos from [facility] requesting to speak to a nurse regarding Humira.      Ph# [phone]

## 2023-03-09 ENCOUNTER — TELEPHONE (OUTPATIENT)
Dept: INTERNAL MEDICINE CLINIC | Facility: CLINIC | Age: 68
End: 2023-03-09

## 2023-03-09 DIAGNOSIS — Z12.11 SCREENING FOR COLON CANCER: Primary | ICD-10-CM

## 2023-03-09 NOTE — TELEPHONE ENCOUNTER
Her last visit I restarted prednisone low-dose. We will see how she does.   At her next visit if she still has symptoms I will discuss with her other options

## 2023-05-01 RX ORDER — METFORMIN HYDROCHLORIDE 500 MG/1
500 TABLET, EXTENDED RELEASE ORAL DAILY
Qty: 90 TABLET | Refills: 0 | Status: SHIPPED | OUTPATIENT
Start: 2023-05-01

## 2023-05-01 NOTE — TELEPHONE ENCOUNTER
Patient is asking for a refill, used to see Yanet Bass.   Will be setting up appointment with another provider

## 2023-05-22 ENCOUNTER — OFFICE VISIT (OUTPATIENT)
Dept: RHEUMATOLOGY | Facility: CLINIC | Age: 68
End: 2023-05-22

## 2023-05-22 ENCOUNTER — LAB ENCOUNTER (OUTPATIENT)
Dept: LAB | Facility: HOSPITAL | Age: 68
End: 2023-05-22
Attending: INTERNAL MEDICINE
Payer: MEDICARE

## 2023-05-22 ENCOUNTER — TELEPHONE (OUTPATIENT)
Dept: RHEUMATOLOGY | Facility: CLINIC | Age: 68
End: 2023-05-22

## 2023-05-22 VITALS
DIASTOLIC BLOOD PRESSURE: 85 MMHG | HEART RATE: 88 BPM | BODY MASS INDEX: 28.85 KG/M2 | WEIGHT: 169 LBS | SYSTOLIC BLOOD PRESSURE: 129 MMHG | HEIGHT: 64 IN

## 2023-05-22 DIAGNOSIS — M06.00 SERONEGATIVE RHEUMATOID ARTHRITIS (HCC): Primary | ICD-10-CM

## 2023-05-22 DIAGNOSIS — M06.00 SERONEGATIVE RHEUMATOID ARTHRITIS (HCC): ICD-10-CM

## 2023-05-22 DIAGNOSIS — Z51.81 MEDICATION MONITORING ENCOUNTER: ICD-10-CM

## 2023-05-22 DIAGNOSIS — R76.8 HEPATITIS B CORE ANTIBODY POSITIVE: ICD-10-CM

## 2023-05-22 DIAGNOSIS — Z12.11 SCREENING FOR COLON CANCER: ICD-10-CM

## 2023-05-22 LAB
ALBUMIN SERPL-MCNC: 3.5 G/DL (ref 3.4–5)
ALT SERPL-CCNC: 14 U/L
AST SERPL-CCNC: 12 U/L (ref 15–37)
BASOPHILS # BLD AUTO: 0.02 X10(3) UL (ref 0–0.2)
BASOPHILS NFR BLD AUTO: 0.4 %
CREAT BLD-MCNC: 0.54 MG/DL
CRP SERPL-MCNC: 1 MG/DL (ref ?–0.3)
DEPRECATED RDW RBC AUTO: 47.2 FL (ref 35.1–46.3)
EOSINOPHIL # BLD AUTO: 0.02 X10(3) UL (ref 0–0.7)
EOSINOPHIL NFR BLD AUTO: 0.4 %
ERYTHROCYTE [DISTWIDTH] IN BLOOD BY AUTOMATED COUNT: 14.2 % (ref 11–15)
ERYTHROCYTE [SEDIMENTATION RATE] IN BLOOD: 108 MM/HR
GFR SERPLBLD BASED ON 1.73 SQ M-ARVRAT: 101 ML/MIN/1.73M2 (ref 60–?)
HCT VFR BLD AUTO: 39 %
HGB BLD-MCNC: 12.6 G/DL
IMM GRANULOCYTES # BLD AUTO: 0.04 X10(3) UL (ref 0–1)
IMM GRANULOCYTES NFR BLD: 0.7 %
LYMPHOCYTES # BLD AUTO: 1 X10(3) UL (ref 1–4)
LYMPHOCYTES NFR BLD AUTO: 17.8 %
MCH RBC QN AUTO: 29.4 PG (ref 26–34)
MCHC RBC AUTO-ENTMCNC: 32.3 G/DL (ref 31–37)
MCV RBC AUTO: 90.9 FL
MONOCYTES # BLD AUTO: 0.43 X10(3) UL (ref 0.1–1)
MONOCYTES NFR BLD AUTO: 7.6 %
NEUTROPHILS # BLD AUTO: 4.12 X10 (3) UL (ref 1.5–7.7)
NEUTROPHILS # BLD AUTO: 4.12 X10(3) UL (ref 1.5–7.7)
NEUTROPHILS NFR BLD AUTO: 73.1 %
PLATELET # BLD AUTO: 265 10(3)UL (ref 150–450)
RBC # BLD AUTO: 4.29 X10(6)UL
WBC # BLD AUTO: 5.6 X10(3) UL (ref 4–11)

## 2023-05-22 PROCEDURE — 84450 TRANSFERASE (AST) (SGOT): CPT

## 2023-05-22 PROCEDURE — 36415 COLL VENOUS BLD VENIPUNCTURE: CPT

## 2023-05-22 PROCEDURE — 82040 ASSAY OF SERUM ALBUMIN: CPT

## 2023-05-22 PROCEDURE — 82565 ASSAY OF CREATININE: CPT

## 2023-05-22 PROCEDURE — 86140 C-REACTIVE PROTEIN: CPT

## 2023-05-22 PROCEDURE — 85652 RBC SED RATE AUTOMATED: CPT

## 2023-05-22 PROCEDURE — 85025 COMPLETE CBC W/AUTO DIFF WBC: CPT

## 2023-05-22 PROCEDURE — 84460 ALANINE AMINO (ALT) (SGPT): CPT

## 2023-05-22 PROCEDURE — 99214 OFFICE O/P EST MOD 30 MIN: CPT | Performed by: INTERNAL MEDICINE

## 2023-05-22 RX ORDER — PREDNISONE 2.5 MG/1
2.5 TABLET ORAL DAILY
Qty: 30 TABLET | Refills: 1 | Status: SHIPPED | OUTPATIENT
Start: 2023-05-22

## 2023-05-22 NOTE — PATIENT INSTRUCTIONS
You were seen today for rheumatoid arthritis  Continue methotrexate 10 pills weekly and folic acid every day  If your joint pain worsens go back on prednisone 2.5 mg daily  Make sure to take calcium 600 mg daily and vitamin D 2000 units daily  For inflammation you can try taking tumeric and fish oil  Blood work today  Can see me in the next 3 to 4 months

## 2023-05-22 NOTE — TELEPHONE ENCOUNTER
Lab calling to state patient stated they should have orders on file, but no active orders on file. Lab requesting call back for patient.

## 2023-05-23 ENCOUNTER — TELEPHONE (OUTPATIENT)
Dept: RHEUMATOLOGY | Facility: CLINIC | Age: 68
End: 2023-05-23

## 2023-05-23 NOTE — TELEPHONE ENCOUNTER
If you can let patient know that I reviewed her blood work. Kidney and liver test are normal.  Inflammation marker ESR is significantly elevated at 108.   I think that she should restart the prednisone, already gave her a refill

## 2023-05-31 NOTE — TELEPHONE ENCOUNTER
Name and  verified. Patient aware of results and recommendation per provider below. Pt verbalized understanding and will restart prednisone as prescribed.

## 2023-08-04 ENCOUNTER — OFFICE VISIT (OUTPATIENT)
Facility: CLINIC | Age: 68
End: 2023-08-04

## 2023-08-04 VITALS
BODY MASS INDEX: 30.05 KG/M2 | HEART RATE: 82 BPM | HEIGHT: 64 IN | RESPIRATION RATE: 14 BRPM | OXYGEN SATURATION: 97 % | SYSTOLIC BLOOD PRESSURE: 122 MMHG | DIASTOLIC BLOOD PRESSURE: 80 MMHG | WEIGHT: 176 LBS

## 2023-08-04 DIAGNOSIS — E11.9 TYPE 2 DIABETES MELLITUS WITHOUT COMPLICATION, WITHOUT LONG-TERM CURRENT USE OF INSULIN (HCC): Primary | ICD-10-CM

## 2023-08-04 DIAGNOSIS — E78.5 DYSLIPIDEMIA: ICD-10-CM

## 2023-08-04 DIAGNOSIS — Z12.31 BREAST CANCER SCREENING BY MAMMOGRAM: ICD-10-CM

## 2023-08-04 DIAGNOSIS — Z12.11 SCREEN FOR COLON CANCER: ICD-10-CM

## 2023-08-04 DIAGNOSIS — M06.00 SERONEGATIVE RHEUMATOID ARTHRITIS (HCC): ICD-10-CM

## 2023-08-04 PROCEDURE — 1126F AMNT PAIN NOTED NONE PRSNT: CPT | Performed by: INTERNAL MEDICINE

## 2023-08-04 PROCEDURE — 99214 OFFICE O/P EST MOD 30 MIN: CPT | Performed by: INTERNAL MEDICINE

## 2023-08-08 ENCOUNTER — HOSPITAL ENCOUNTER (OUTPATIENT)
Dept: MAMMOGRAPHY | Facility: HOSPITAL | Age: 68
Discharge: HOME | End: 2023-08-08
Attending: INTERNAL MEDICINE
Payer: MEDICARE

## 2023-08-08 ENCOUNTER — LAB ENCOUNTER (OUTPATIENT)
Dept: LAB | Facility: HOSPITAL | Age: 68
End: 2023-08-08
Attending: INTERNAL MEDICINE
Payer: MEDICARE

## 2023-08-08 DIAGNOSIS — E78.5 DYSLIPIDEMIA: ICD-10-CM

## 2023-08-08 DIAGNOSIS — Z12.31 BREAST CANCER SCREENING BY MAMMOGRAM: ICD-10-CM

## 2023-08-08 DIAGNOSIS — E11.9 TYPE 2 DIABETES MELLITUS WITHOUT COMPLICATION, WITHOUT LONG-TERM CURRENT USE OF INSULIN (HCC): ICD-10-CM

## 2023-08-08 LAB
CHOLEST SERPL-MCNC: 147 MG/DL (ref ?–200)
CREAT UR-SCNC: 201 MG/DL
EST. AVERAGE GLUCOSE BLD GHB EST-MCNC: 123 MG/DL (ref 68–126)
FASTING PATIENT LIPID ANSWER: YES
HBA1C MFR BLD: 5.9 % (ref ?–5.7)
HDLC SERPL-MCNC: 62 MG/DL (ref 40–59)
LDLC SERPL CALC-MCNC: 71 MG/DL (ref ?–100)
MICROALBUMIN UR-MCNC: 1.86 MG/DL
MICROALBUMIN/CREAT 24H UR-RTO: 9.3 UG/MG (ref ?–30)
NONHDLC SERPL-MCNC: 85 MG/DL (ref ?–130)
TRIGL SERPL-MCNC: 70 MG/DL (ref 30–149)
TSI SER-ACNC: 1.05 MIU/ML (ref 0.36–3.74)
VLDLC SERPL CALC-MCNC: 11 MG/DL (ref 0–30)

## 2023-08-08 PROCEDURE — 84443 ASSAY THYROID STIM HORMONE: CPT

## 2023-08-08 PROCEDURE — 82043 UR ALBUMIN QUANTITATIVE: CPT

## 2023-08-08 PROCEDURE — 80061 LIPID PANEL: CPT

## 2023-08-08 PROCEDURE — 83036 HEMOGLOBIN GLYCOSYLATED A1C: CPT

## 2023-08-08 PROCEDURE — 77067 SCR MAMMO BI INCL CAD: CPT | Performed by: INTERNAL MEDICINE

## 2023-08-08 PROCEDURE — 82570 ASSAY OF URINE CREATININE: CPT

## 2023-08-08 PROCEDURE — 36415 COLL VENOUS BLD VENIPUNCTURE: CPT

## 2023-08-08 PROCEDURE — 77063 BREAST TOMOSYNTHESIS BI: CPT | Performed by: INTERNAL MEDICINE

## 2023-08-30 RX ORDER — METFORMIN HYDROCHLORIDE 500 MG/1
500 TABLET, EXTENDED RELEASE ORAL DAILY
Qty: 90 TABLET | Refills: 3 | Status: SHIPPED | OUTPATIENT
Start: 2023-08-30

## 2023-08-31 RX ORDER — ATORVASTATIN CALCIUM 20 MG/1
20 TABLET, FILM COATED ORAL DAILY
Qty: 90 TABLET | Refills: 3 | Status: SHIPPED | OUTPATIENT
Start: 2023-08-31

## 2023-10-23 ENCOUNTER — TELEPHONE (OUTPATIENT)
Dept: INTERNAL MEDICINE CLINIC | Facility: CLINIC | Age: 68
End: 2023-10-23

## 2023-10-23 NOTE — TELEPHONE ENCOUNTER
.  To get more information.  Why did we need an MRI order of the brain?  Who ordered the MRI of the brain?  If patient is requesting an MRI ordered.  Unfortunately we cannot put the order without an evaluation.  Please instruct her to make an appointment to see APRN and if needed, can place the order.

## 2023-10-23 NOTE — TELEPHONE ENCOUNTER
Patient called requesting referral for MRI     Patient is requesting referral.     Name of specialist and specialty department : MRI  Reason for visit with the specialist: For head  Address of the specialist office:[address]  Appointment date: N/A   Phone: [phone]      CSS informed patient the turnaround time for referral is 5-7 business days.  Patient was informed to check their [employer] account for referral status.     Please call patient when referral has been made  [phone]

## 2023-10-24 NOTE — TELEPHONE ENCOUNTER
Patient states MRI of brain was suggested at last office visit (8/4/2023) to be completed at an outside facility due to ongoing vertigo.   Patient offered an appointment and declined.   Per patient requested message be sent to provider.    Per patient use number [phone] (Home Phone) to contact patient.

## 2023-10-24 NOTE — TELEPHONE ENCOUNTER
I talked to patient extensively today.  She informed me that she continues to have dizziness that started after her injury.  She was seen by neurologist and she had MRI at [facility].  During the last visit, she mentioned this to me and I informed her that if she continues to have symptoms, it is better to get another evaluation from a tertiary center.  (I meant was to get another neuro evaluation) she thought I meant MRI.  I apologized to her for the confusion as I did not write it down for her.  She informed me that she has traumatic brain injury and she is wondering about normal MRI.  I informed her that I am not experienced to review the MRI findings of traumatic brain injury.  Finally, she agreed to get another evaluation from tertiary center.  Total time spent-approximately 25 minutes.

## 2023-12-12 DIAGNOSIS — Z51.81 MEDICATION MONITORING ENCOUNTER: ICD-10-CM

## 2023-12-12 DIAGNOSIS — M06.00 SERONEGATIVE RHEUMATOID ARTHRITIS (HCC): ICD-10-CM

## 2023-12-12 RX ORDER — FOLIC ACID 1 MG/1
1 TABLET ORAL DAILY
Qty: 90 TABLET | Refills: 2 | Status: SHIPPED | OUTPATIENT
Start: 2023-12-12

## 2023-12-12 NOTE — TELEPHONE ENCOUNTER
Current Outpatient Medications   Medication Sig Dispense Refill    folic acid 1 MG Oral Tab Take 1 tablet (1 mg total) by mouth daily.  90 tablet 2

## 2023-12-12 NOTE — TELEPHONE ENCOUNTER
LOV:  /  Future Appointments   Date/ Time Provider Alfonzo Novoa   3/12/2024  1:40 PM Maged Abreu MD 2014 [facility]     Labs:    Component      Latest Ref Rng 5/22/2023   WBC      4.0 - 11.0 x10(3) uL 5.6    RBC      3.80 - 5.30 x10(6)uL 4.29    Hemoglobin      12.0 - 16.0 g/dL 12.6    Hematocrit      35.0 - 48.0 % 39.0    MCV      80.0 - 100.0 fL 90.9    MCH      26.0 - 34.0 pg 29.4    MCHC      31.0 - 37.0 g/dL 32.3    RDW-SD      35.1 - 46.3 fL 47.2 (H)    RDW      11.0 - 15.0 % 14.2    Platelet Count      394.7 - 450.0 10(3)uL 265.0    Prelim Neutrophil Abs      1.50 - 7.70 x10 (3) uL 4.12    Neutrophils Absolute      1.50 - 7.70 x10(3) uL 4.12    Lymphocytes Absolute      1.00 - 4.00 x10(3) uL 1.00    Monocytes Absolute      0.10 - 1.00 x10(3) uL 0.43    Eosinophils Absolute      0.00 - 0.70 x10(3) uL 0.02    Basophils Absolute      0.00 - 0.20 x10(3) uL 0.02    Immature Granulocyte Absolute      0.00 - 1.00 x10(3) uL 0.04    Neutrophils %      % 73.1    Lymphocytes %      % 17.8    Monocytes %      % 7.6    Eosinophils %      % 0.4    Basophils %      % 0.4    Immature Granulocyte %      % 0.7    CREATININE      0.55 - 1.02 mg/dL 0.54 (L)    EGFR      >=60 mL/min/1.73m2 101    SED RATE      0 - 30 mm/Hr 108 (H)    C-REACTIVE PROTEIN      <0.30 mg/dL 1.00 (H)    ALT (SGPT)      13 - 56 U/L 14    AST (SGOT)      15 - 37 U/L 12 (L)    Albumin      3.4 - 5.0 g/dL 3.5       Legend:  (H) High  (L) Low

## 2024-03-12 ENCOUNTER — LAB ENCOUNTER (OUTPATIENT)
Dept: LAB | Facility: HOSPITAL | Age: 69
End: 2024-03-12
Attending: INTERNAL MEDICINE
Payer: MEDICARE

## 2024-03-12 ENCOUNTER — OFFICE VISIT (OUTPATIENT)
Dept: RHEUMATOLOGY | Facility: CLINIC | Age: 69
End: 2024-03-12

## 2024-03-12 VITALS
RESPIRATION RATE: 16 BRPM | HEIGHT: 64 IN | HEART RATE: 90 BPM | SYSTOLIC BLOOD PRESSURE: 145 MMHG | DIASTOLIC BLOOD PRESSURE: 82 MMHG | WEIGHT: 165.38 LBS | BODY MASS INDEX: 28.24 KG/M2

## 2024-03-12 DIAGNOSIS — M06.00 SERONEGATIVE RHEUMATOID ARTHRITIS (HCC): ICD-10-CM

## 2024-03-12 DIAGNOSIS — Z51.81 MEDICATION MONITORING ENCOUNTER: ICD-10-CM

## 2024-03-12 DIAGNOSIS — M06.00 SERONEGATIVE RHEUMATOID ARTHRITIS (HCC): Primary | ICD-10-CM

## 2024-03-12 LAB
ALBUMIN SERPL-MCNC: 4.1 G/DL (ref 3.2–4.8)
ALT SERPL-CCNC: <7 U/L
AST SERPL-CCNC: 10 U/L (ref ?–34)
BASOPHILS # BLD AUTO: 0.02 X10(3) UL (ref 0–0.2)
BASOPHILS NFR BLD AUTO: 0.3 %
CREAT BLD-MCNC: 0.58 MG/DL
CRP SERPL-MCNC: 1.1 MG/DL (ref ?–1)
DEPRECATED RDW RBC AUTO: 45.5 FL (ref 35.1–46.3)
EGFRCR SERPLBLD CKD-EPI 2021: 99 ML/MIN/1.73M2 (ref 60–?)
EOSINOPHIL # BLD AUTO: 0.04 X10(3) UL (ref 0–0.7)
EOSINOPHIL NFR BLD AUTO: 0.6 %
ERYTHROCYTE [DISTWIDTH] IN BLOOD BY AUTOMATED COUNT: 14 % (ref 11–15)
ERYTHROCYTE [SEDIMENTATION RATE] IN BLOOD: 56 MM/HR
HCT VFR BLD AUTO: 37.9 %
HGB BLD-MCNC: 12.8 G/DL
IMM GRANULOCYTES # BLD AUTO: 0.03 X10(3) UL (ref 0–1)
IMM GRANULOCYTES NFR BLD: 0.4 %
LYMPHOCYTES # BLD AUTO: 1.03 X10(3) UL (ref 1–4)
LYMPHOCYTES NFR BLD AUTO: 15.1 %
MCH RBC QN AUTO: 30.3 PG (ref 26–34)
MCHC RBC AUTO-ENTMCNC: 33.8 G/DL (ref 31–37)
MCV RBC AUTO: 89.8 FL
MONOCYTES # BLD AUTO: 0.49 X10(3) UL (ref 0.1–1)
MONOCYTES NFR BLD AUTO: 7.2 %
NEUTROPHILS # BLD AUTO: 5.23 X10 (3) UL (ref 1.5–7.7)
NEUTROPHILS # BLD AUTO: 5.23 X10(3) UL (ref 1.5–7.7)
NEUTROPHILS NFR BLD AUTO: 76.4 %
PLATELET # BLD AUTO: 284 10(3)UL (ref 150–450)
RBC # BLD AUTO: 4.22 X10(6)UL
WBC # BLD AUTO: 6.8 X10(3) UL (ref 4–11)

## 2024-03-12 PROCEDURE — 99214 OFFICE O/P EST MOD 30 MIN: CPT | Performed by: INTERNAL MEDICINE

## 2024-03-12 PROCEDURE — 85025 COMPLETE CBC W/AUTO DIFF WBC: CPT

## 2024-03-12 PROCEDURE — 36415 COLL VENOUS BLD VENIPUNCTURE: CPT

## 2024-03-12 PROCEDURE — 84460 ALANINE AMINO (ALT) (SGPT): CPT

## 2024-03-12 PROCEDURE — 85652 RBC SED RATE AUTOMATED: CPT

## 2024-03-12 PROCEDURE — 84450 TRANSFERASE (AST) (SGOT): CPT

## 2024-03-12 PROCEDURE — 82040 ASSAY OF SERUM ALBUMIN: CPT

## 2024-03-12 PROCEDURE — 86140 C-REACTIVE PROTEIN: CPT

## 2024-03-12 PROCEDURE — 82565 ASSAY OF CREATININE: CPT

## 2024-03-12 RX ORDER — PREDNISONE 5 MG/1
5 TABLET ORAL DAILY
Qty: 90 TABLET | Refills: 1 | Status: SHIPPED | OUTPATIENT
Start: 2024-03-12

## 2024-03-12 RX ORDER — FOLIC ACID 1 MG/1
1 TABLET ORAL DAILY
Qty: 90 TABLET | Refills: 2 | Status: SHIPPED | OUTPATIENT
Start: 2024-03-12

## 2024-03-12 RX ORDER — METHOTREXATE 2.5 MG/1
25 TABLET ORAL WEEKLY
Qty: 130 TABLET | Refills: 1 | Status: SHIPPED | OUTPATIENT
Start: 2024-03-12

## 2024-03-12 NOTE — PATIENT INSTRUCTIONS
You were seen today for rheumatoid arthritis  You are still having some pain in your shoulders and your joints  Continue methotrexate 10 pills every 7 days  Folic acid 1 pill a day  Since you are still having joint pain take prednisone 5 mg daily which is 1 pill a day  Blood work today  Blood work also in July  See me in July
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
## 2024-04-25 ENCOUNTER — TELEPHONE (OUTPATIENT)
Facility: CLINIC | Age: 69
End: 2024-04-25

## 2024-05-13 NOTE — TELEPHONE ENCOUNTER
PCP field updated to reflect patient's current PCP, Edilson Ball MD - [facility]

## 2024-07-15 ENCOUNTER — LAB ENCOUNTER (OUTPATIENT)
Dept: LAB | Facility: HOSPITAL | Age: 69
End: 2024-07-15
Attending: INTERNAL MEDICINE
Payer: MEDICARE

## 2024-07-15 ENCOUNTER — OFFICE VISIT (OUTPATIENT)
Dept: RHEUMATOLOGY | Facility: CLINIC | Age: 69
End: 2024-07-15

## 2024-07-15 VITALS
DIASTOLIC BLOOD PRESSURE: 81 MMHG | HEIGHT: 64 IN | HEART RATE: 87 BPM | BODY MASS INDEX: 28.85 KG/M2 | SYSTOLIC BLOOD PRESSURE: 131 MMHG | WEIGHT: 169 LBS

## 2024-07-15 DIAGNOSIS — M06.00 SERONEGATIVE RHEUMATOID ARTHRITIS (HCC): ICD-10-CM

## 2024-07-15 DIAGNOSIS — M06.00 SERONEGATIVE RHEUMATOID ARTHRITIS (HCC): Primary | ICD-10-CM

## 2024-07-15 DIAGNOSIS — Z51.81 MEDICATION MONITORING ENCOUNTER: ICD-10-CM

## 2024-07-15 LAB
ALBUMIN SERPL-MCNC: 4.3 G/DL (ref 3.2–4.8)
ALT SERPL-CCNC: <7 U/L
AST SERPL-CCNC: 12 U/L (ref ?–34)
BASOPHILS # BLD AUTO: 0.02 X10(3) UL (ref 0–0.2)
BASOPHILS NFR BLD AUTO: 0.2 %
CREAT BLD-MCNC: 0.61 MG/DL
CRP SERPL-MCNC: 0.5 MG/DL (ref ?–1)
DEPRECATED RDW RBC AUTO: 48 FL (ref 35.1–46.3)
EGFRCR SERPLBLD CKD-EPI 2021: 97 ML/MIN/1.73M2 (ref 60–?)
EOSINOPHIL # BLD AUTO: 0.04 X10(3) UL (ref 0–0.7)
EOSINOPHIL NFR BLD AUTO: 0.5 %
ERYTHROCYTE [DISTWIDTH] IN BLOOD BY AUTOMATED COUNT: 14.2 % (ref 11–15)
ERYTHROCYTE [SEDIMENTATION RATE] IN BLOOD: 45 MM/HR
HCT VFR BLD AUTO: 40.7 %
HGB BLD-MCNC: 13.3 G/DL
IMM GRANULOCYTES # BLD AUTO: 0.08 X10(3) UL (ref 0–1)
IMM GRANULOCYTES NFR BLD: 0.9 %
LYMPHOCYTES # BLD AUTO: 1.31 X10(3) UL (ref 1–4)
LYMPHOCYTES NFR BLD AUTO: 15 %
MCH RBC QN AUTO: 30.1 PG (ref 26–34)
MCHC RBC AUTO-ENTMCNC: 32.7 G/DL (ref 31–37)
MCV RBC AUTO: 92.1 FL
MONOCYTES # BLD AUTO: 0.69 X10(3) UL (ref 0.1–1)
MONOCYTES NFR BLD AUTO: 7.9 %
NEUTROPHILS # BLD AUTO: 6.61 X10 (3) UL (ref 1.5–7.7)
NEUTROPHILS # BLD AUTO: 6.61 X10(3) UL (ref 1.5–7.7)
NEUTROPHILS NFR BLD AUTO: 75.5 %
PLATELET # BLD AUTO: 166 10(3)UL (ref 150–450)
RBC # BLD AUTO: 4.42 X10(6)UL
WBC # BLD AUTO: 8.8 X10(3) UL (ref 4–11)

## 2024-07-15 PROCEDURE — 82565 ASSAY OF CREATININE: CPT

## 2024-07-15 PROCEDURE — 84460 ALANINE AMINO (ALT) (SGPT): CPT

## 2024-07-15 PROCEDURE — 85652 RBC SED RATE AUTOMATED: CPT

## 2024-07-15 PROCEDURE — 85025 COMPLETE CBC W/AUTO DIFF WBC: CPT

## 2024-07-15 PROCEDURE — 86140 C-REACTIVE PROTEIN: CPT

## 2024-07-15 PROCEDURE — 82040 ASSAY OF SERUM ALBUMIN: CPT

## 2024-07-15 PROCEDURE — 36415 COLL VENOUS BLD VENIPUNCTURE: CPT

## 2024-07-15 PROCEDURE — G2211 COMPLEX E/M VISIT ADD ON: HCPCS | Performed by: INTERNAL MEDICINE

## 2024-07-15 PROCEDURE — 99214 OFFICE O/P EST MOD 30 MIN: CPT | Performed by: INTERNAL MEDICINE

## 2024-07-15 PROCEDURE — 84450 TRANSFERASE (AST) (SGOT): CPT
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
## 2024-07-15 NOTE — PATIENT INSTRUCTIONS
You were seen today for rheumatoid arthritis  Continue methotrexate 10 pills weekly, folic acid daily and prednisone 5 mg daily  You could also take omega-3's  You had blood work today, we will send you a message in [email or web address] regarding the results or someone will call you  Follow-up in the next 4 to 5 months    Also get the bone density ordered by your PCP

## 2024-07-18 ENCOUNTER — TELEPHONE (OUTPATIENT)
Dept: RHEUMATOLOGY | Facility: CLINIC | Age: 69
End: 2024-07-18

## 2024-07-18 NOTE — TELEPHONE ENCOUNTER
Called pt; verified name and . Informed her of lab results as per Dr Purvis's message below. All pt questions answered.

## 2024-07-18 NOTE — TELEPHONE ENCOUNTER
If you can let patient know that I reviewed her blood work.  Normal kidney and liver tests.  Inflammation marker ESR slightly high at 46 but this is been chronic.

## 2024-09-06 NOTE — TELEPHONE ENCOUNTER
LOV: 7/15/24  Future Appointments   Date Time Provider Department Center   12/4/2024 12:00 PM Jackie Purvis MD [facility]   Labs: AST 12 ALT 7  7/15/24    ASSESSMENT/PLAN:      Seronegative RA (bilateral shoulder, hip stiffness and R hand swelling, ultrasound + for inflammatory arthritis)  - Joint pain and stiffness better controlled now   - Stopped HCQ in September due to blurry vision.  She was seen by ophthalmology in October while off Plaquenil and there was no evidence of Plaquenil toxicity  - Continue Methotrexate 10 pills weekly and FA daily and prednisone 5 mg daily  - she does not want to go on anything stronger such as biologics  - can't go on SSZ due to sulfa allergies  - symptoms improve with prednisone  - advised to take calcium and vitamin D  - Blood work today and every 3-4 mos      Long-term prednisone  - She will get the bone density ordered through her PCP     Dizziness/vertigo   - due to TBI     +Hep B core Ab   - HBV PCR negative  - We will need to monitor her liver functions on DMARDs or even biologic     Pt will f/u in 3-4 mos      There is a longitudinal care relationship with me, the care plan reflects the ongoing nature of the continuous relationship of care, and the medical record indicates that there is ongoing treatment of a serious/complex medical condition which I am currently managing.  is Applicable.      Jackie Purvis MD  7/15/2024  1:40 PM

## 2024-09-07 RX ORDER — PREDNISONE 5 MG/1
5 TABLET ORAL DAILY
Qty: 90 TABLET | Refills: 1 | Status: SHIPPED | OUTPATIENT
Start: 2024-09-07

## 2024-09-07 RX ORDER — METHOTREXATE 2.5 MG/1
25 TABLET ORAL WEEKLY
Qty: 130 TABLET | Refills: 1 | Status: SHIPPED | OUTPATIENT
Start: 2024-09-07

## 2024-12-10 RX ORDER — ATORVASTATIN CALCIUM 20 MG/1
20 TABLET, FILM COATED ORAL DAILY
Qty: 30 TABLET | Refills: 0 | Status: SHIPPED | OUTPATIENT
Start: 2024-12-10

## 2024-12-10 NOTE — TELEPHONE ENCOUNTER
Please Review. Protocol Failed; No Protocol   Sent to Patient  to have patient schedule an appointment (patient does not use [facility])  LAST OFFICE VISIT 08/04/2023      Requested Prescriptions   Pending Prescriptions Disp Refills    ATORVASTATIN 20 MG Oral Tab [Pharmacy Med Name: ATORVASTATIN 20MG TABLETS] 90 tablet 3     Sig: TAKE 1 TABLET(20 MG) BY MOUTH DAILY       Cholesterol Medication Protocol Failed - 12/10/2024  8:32 AM        Failed - Lipid panel within past 12 months     Lab Results   Component Value Date    CHOLEST 147 08/08/2023    TRIG 70 08/08/2023    HDL 62 (H) 08/08/2023    LDL 71 08/08/2023    VLDL 11 08/08/2023    NONHDLC 85 08/08/2023             Failed - In person appointment or virtual visit in the past 12 mos or appointment in next 3 mos     Recent Outpatient Visits              4 months ago Seronegative rheumatoid arthritis ([facility])    [facility], Jackie Olivarez MD    Office Visit    9 months ago Seronegative rheumatoid arthritis ([facility])    [facility], Jackie Olivarez MD    Office Visit    1 year ago Type 2 diabetes mellitus without complication, without long-term current use of insulin ([facility])    [facility], Christiano Martinez MD    Office Visit    1 year ago [facility]    [facility]    1 year [facility]    [facility]                      Passed - ALT < 80     Lab Results   Component Value Date    ALT <7 (L) 07/15/2024             Passed - ALT resulted within past year                 Recent Outpatient Visits              4 months ago Seronegative rheumatoid arthritis ([facility])    [facility], Jackie Olivarez MD     Office Visit    9 months ago Seronegative rheumatoid arthritis ([facility])    [facility], [facility], Jackie Olivarez MD    Office Visit    1 year ago Type 2 diabetes mellitus without complication, without long-term current use of insulin ([facility])    [facility], [facility], Christiano Martinez MD    Office Visit    1 year ago Seronegative rheumatoid arthritis ([facility])    [facility], [facility], Jackie Olivarez MD    Office Visit    1 year ago Seronegative rheumatoid arthritis ([facility])    [facility], [facility], Jackie Olivarez MD    Office Visit

## 2024-12-10 NOTE — TELEPHONE ENCOUNTER
I have approved 30-day supply.  Please instruct patient that I am not comfortable and it is not appropriate for me to refill medications without at least 1 office visit per year.  If we are not able to reach patient, please send a certified letter.  Thank you

## 2024-12-11 NOTE — TELEPHONE ENCOUNTER
No future appointments.    Left message to call back; patient is not MyChart active [2nd attempt]    RN Triage - please check if patient called back and scheduled visit; if not please mail certified letter per Dr. Grier's note below. Thank you.

## 2025-01-13 ENCOUNTER — TELEPHONE (OUTPATIENT)
Dept: INTERNAL MEDICINE CLINIC | Facility: CLINIC | Age: 70
End: 2025-01-13

## 2025-01-13 NOTE — TELEPHONE ENCOUNTER
MARINA Littlejohn    Patient's sister, Sujey leary (name and , ANANYA verified) to provide an update for the patient. Patient will be moving to [place] with her family for recovering of a stroke. Patient is currently in Rehab and will be released soon. Patient will be re-establishing with a new provider.

## (undated) DIAGNOSIS — G47.33 OBSTRUCTIVE SLEEP APNEA (ADULT) (PEDIATRIC): Primary | ICD-10-CM

## (undated) DIAGNOSIS — I10 ESSENTIAL HYPERTENSION: Primary | ICD-10-CM

## (undated) DIAGNOSIS — Z12.11 ENCOUNTER FOR FIT (FECAL IMMUNOCHEMICAL TEST) SCREENING: Primary | ICD-10-CM

## (undated) DIAGNOSIS — R94.31 ABNORMAL EKG: ICD-10-CM

## (undated) DIAGNOSIS — I10 HYPERTENSION: ICD-10-CM

## (undated) NOTE — LETTER
12/11/2024    Manda Hwang  [address] [facility] [address]         Dear Manda,    This letter is to inform you that our office has made several attempts to reach you by phone without success.  We were attempting to contact you by phone regarding prescription request    Please contact our office at the number listed below as soon as you receive this letter to discuss this issue and to make the necessary changes in our system to your contact information.  Thank you for your cooperation.        Sincerely,    Christiano Grier MD  [address] [facility] [address]  Ph: [phone]  Fax: [phone]         Document electronically generated by:  Kenyatta URRUTIA RN